# Patient Record
Sex: MALE | Race: WHITE | NOT HISPANIC OR LATINO | Employment: FULL TIME | URBAN - METROPOLITAN AREA
[De-identification: names, ages, dates, MRNs, and addresses within clinical notes are randomized per-mention and may not be internally consistent; named-entity substitution may affect disease eponyms.]

---

## 2017-02-15 ENCOUNTER — OFFICE VISIT (OUTPATIENT)
Dept: PHYSICAL MEDICINE AND REHAB | Facility: MEDICAL CENTER | Age: 32
End: 2017-02-15
Payer: MEDICAID

## 2017-02-15 VITALS
OXYGEN SATURATION: 94 % | DIASTOLIC BLOOD PRESSURE: 68 MMHG | SYSTOLIC BLOOD PRESSURE: 110 MMHG | HEART RATE: 89 BPM | WEIGHT: 151 LBS | HEIGHT: 76 IN | BODY MASS INDEX: 18.39 KG/M2 | TEMPERATURE: 98.4 F

## 2017-02-15 DIAGNOSIS — M54.9 MID BACK PAIN: ICD-10-CM

## 2017-02-15 DIAGNOSIS — G62.9 POLYNEUROPATHY: ICD-10-CM

## 2017-02-15 DIAGNOSIS — M79.2 NERVE PAIN: ICD-10-CM

## 2017-02-15 DIAGNOSIS — Z71.89 PAIN MEDICATION AGREEMENT DISCUSSED: ICD-10-CM

## 2017-02-15 DIAGNOSIS — G11.11 FRIEDREICH ATAXIA (HCC): ICD-10-CM

## 2017-02-15 DIAGNOSIS — G89.29 CHRONIC BILATERAL LOW BACK PAIN, WITH SCIATICA PRESENCE UNSPECIFIED: ICD-10-CM

## 2017-02-15 DIAGNOSIS — R25.2 MUSCLE CRAMPS: ICD-10-CM

## 2017-02-15 DIAGNOSIS — R26.9 IMPAIRED GAIT: ICD-10-CM

## 2017-02-15 DIAGNOSIS — M41.9 SCOLIOSIS OF THORACOLUMBAR SPINE, UNSPECIFIED SCOLIOSIS TYPE: ICD-10-CM

## 2017-02-15 DIAGNOSIS — M25.571 RIGHT ANKLE PAIN, UNSPECIFIED CHRONICITY: ICD-10-CM

## 2017-02-15 DIAGNOSIS — M54.5 CHRONIC BILATERAL LOW BACK PAIN, WITH SCIATICA PRESENCE UNSPECIFIED: ICD-10-CM

## 2017-02-15 PROCEDURE — 99204 OFFICE O/P NEW MOD 45 MIN: CPT | Performed by: PHYSICAL MEDICINE & REHABILITATION

## 2017-02-15 RX ORDER — TRAMADOL HYDROCHLORIDE 50 MG/1
TABLET ORAL
COMMUNITY
Start: 2016-11-22 | End: 2017-02-15

## 2017-02-15 RX ORDER — OXYCODONE HYDROCHLORIDE AND ACETAMINOPHEN 5; 325 MG/1; MG/1
TABLET ORAL
Qty: 20 TAB | Refills: 0 | Status: SHIPPED | OUTPATIENT
Start: 2017-02-15 | End: 2017-03-28 | Stop reason: SDUPTHER

## 2017-02-15 ASSESSMENT — ENCOUNTER SYMPTOMS
FEVER: 0
NECK PAIN: 1
EYE PAIN: 0
DIARRHEA: 0
CLAUDICATION: 0
CHILLS: 0
MYALGIAS: 1
TINGLING: 1
VOMITING: 0
SENSORY CHANGE: 1
HEMOPTYSIS: 0
EYE DISCHARGE: 0
BACK PAIN: 1
ORTHOPNEA: 0
SPUTUM PRODUCTION: 0

## 2017-02-15 NOTE — PROGRESS NOTES
Subjective:      Aris Douglass is a 31 y.o. 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 The patient has family history of Friedreich's ataxia, apparently genetic testing not covered by insurance.    Regarding today's visit:    The patient notes ongoing pain in the mid back as well as the low back. The patient notes lower limb nerve pain. The patient notes decreased sensation below the knees.    The patient notes mid back pain, some posterior chest wall pain, without overt radicular component.    The patient notes intermittent neck pain, controlled, without radicular component.    The patient notes intermittent paresthesias in the hands, primarily on the left.    The patient notes right ankle pain, primarily the lateral aspect. He has had prior ankle sprain in approximately November 2016, seen by orthopedics, records pending. The patient reports ankle instability symptoms. The patient had prior right foot fracture is 2014, healed. He has a right ankle brace.    The patient has had prior treatment with medications. He is tried therapy/exercise. No bowel/bladder incontinence noted. He is making an effort with home exercise program as tolerated. The ongoing pain limits his ability to function. He is inquiring about additional treatment options, also presents as a transfer of care for pain management.      MEDICAL RECORDS REVIEW/DATA REVIEW: Reviewed in epic.    Records Reviewed: Reviewed referring provider notes. Review cardiology records 8/2016.    I reviewed medications.  Current pain not adequately controlled. Has used Percocet in the past, with benefit, tolerated. Tolerating gabapentin and baclofen, with benefit. Uses ibuprofen/NSAIDs intermittently. No aberrant behavior noted. Previously tried tramadol, not tolerated or effective.    I reviewed  profile  2/15/2017.     I reviewed diagnostic studies:     I reviewed radiographs. Reviewed MRI cervical spine 7/2016. Reviewed MRI brain 7/2016.    I reviewed lab studies. No recent laboratory studies available for review.    I reviewed medical issues. Followed by primary care provider, records reviewed.    I reviewed family history: No neuromuscular disorders noted.    I reviewed social issues. Enjoys working with cars.      PAST MEDICAL HISTORY:   Past Medical History   Diagnosis Date   • No known health problems    • Fracture 2000     right foot/ right ankle       PAST SURGICAL HISTORY:    Past Surgical History   Procedure Laterality Date   • Knee arthroscopy  2000     right knee   • Elbow orif  2001     left elbow   • Percutaneouospinning wrist  2001     left    • Mandible reduction closed  2001       ALLERGIES:  Hydrocodone    MEDICATIONS:    Outpatient Encounter Prescriptions as of 2/15/2017   Medication Sig Dispense Refill   • oxycodone-acetaminophen (PERCOCET) 5-325 MG Tab Take 1/2  to 1 tablets by daily as needed for pain. 20 Tab 0   • gabapentin (NEURONTIN) 600 MG tablet Take 1 Tab by mouth 3 times a day. 90 Tab 11   • baclofen (LIORESAL) 10 MG Tab Take 1 Tab by mouth 2 times a day. (Patient taking differently: Take 10 mg by mouth 3 times a day.) 60 Tab 5   • [DISCONTINUED] tramadol (ULTRAM) 50 MG Tab      • ibuprofen (MOTRIN) 600 MG TABS Take 1 Tab by mouth every 8 hours as needed for Mild Pain for up to 30 doses. 30 Tab 0     No facility-administered encounter medications on file as of 2/15/2017.       SOCIAL HISTORY:    Social History     Social History   • Marital Status: Single     Spouse Name: N/A   • Number of Children: N/A   • Years of Education: N/A     Social History Main Topics   • Smoking status: Current Every Day Smoker -- 0.50 packs/day for 10 years     Types: Cigarettes   • Smokeless tobacco: None   • Alcohol Use: No      Comment: notes rare use, agrees to avoid use   • Drug Use: No   • Sexual  "Activity: Not Asked     Other Topics Concern   • None     Social History Narrative     No substance use/abuse noted.    Other than perhaps anxiety/depression, adjustment disorder,psychiatric disorder noted    Review of Systems   Constitutional: Negative for fever and chills.   HENT: Negative for hearing loss and tinnitus.    Eyes: Negative for pain and discharge.   Respiratory: Negative for hemoptysis and sputum production.    Cardiovascular: Negative for orthopnea and claudication.   Gastrointestinal: Negative for vomiting and diarrhea.   Genitourinary: Negative for urgency and frequency.   Musculoskeletal: Positive for myalgias, back pain, joint pain and neck pain.   Skin: Negative.    Neurological: Positive for tingling and sensory change.   Endo/Heme/Allergies: Negative.    All other systems reviewed and are negative.        Objective:     /68 mmHg  Pulse 89  Temp(Src) 36.9 °C (98.4 °F)  Ht 1.93 m (6' 3.98\")  Wt 68.493 kg (151 lb)  BMI 18.39 kg/m2  SpO2 94%     Physical Exam  Constitutional: oriented to person, place, and time, appears well-developed and well-nourished.   HEENT: Normocephalic atraumatic, neck supple, no JVD noted, no masses noted, no meningeal signs noted  Lymphadenopathy: no cervical, supraclavicular, or inguinal lymphadenopathy noted  Cardiovascular: Intact distal pulses, including at wrists and ankles, no limb swelling noted  Pulmonary: No tachypnea noted, no accessory muscle use noted, no dyspnea noted  Abdominal: Soft, nontender, exhibits no distension, no peritoneal signs, no HSM  Musculoskeletal:   Right shoulder: exhibits only mild tenderness. Minimal pain with range of motion testing  Left shoulder: exhibits only mild tenderness. Minimal pain with range of motion testing  Right hip: exhibits only mild tenderness. Minimal pain with range of motion testing  Left hip: exhibits only mild tenderness. Minimal pain with range of motion testing  Cervical back: exhibits mild " decreased range of motion, mild tenderness and mild pain. Spurling's testing produces mild axial pain, trigger points noted  Lumbar back: exhibits mild decreased range of motion, mild tenderness and mild pain. negative straight leg testing, trigger points noted, scoliosis noted  Thoracic: Tender with palpation mid back region, pain with range of motion testing, scoliosis noted  Ankle: Tender with palpation about right ankle, prominent lateral malleolus, mild instability with stress testing, no signs of infection  Wrist/hand: mild pain with range of motion testing, negative tinel's at wrist, negative tinel's at elbows  Neurological: oriented to person, place, and time. Cranial nerves grossly intact, normal strength for condition. Sensation intact distally. Reflexes 1+ in upper and lower limbs, Gait antalgic, wide-based, reciprocal, unable to perform heel walk/toe walk, unsteady with Romberg testing with eyes open  Skin: Skin is intact. no rashes or lesions noted  Psychiatric: normal mood and affect. speech is normal and behavior is normal. Judgment and thought content normal. Cognition and memory are normal.         Assessment/Plan:       ASSESSMENT:    1. Friedreich's ataxia, sensory-motor axonal polyneuropathy, neurology following    2. Low back pain, myofascial pain, lower limb nerve pain, scoliosis, consider radiculitis/stenosis    - Ordered thoracolumbar x-ray  - Ordered MRI lumbar spine without contrast    3. Mid back pain, myofascial pain, nerve pain, scoliosis, consider stenosis    - Ordered thoracolumbar x-rays  - Ordered MRI thoracic spine without contrast    4. Right ankle pain, sprain strain, prominent lateral malleolus, instability symptoms, remote right foot fracture    - Ordered right ankle x-rays  - Ordered MRI right ankle to evaluate for soft tissue pathology    5. Neck pain, myofascial pain, relatively controlled    6. Controlled substance agreement discussed    - Check results on urine drug screen,  provided today  - I would be interested reviewing most recent laboratory studies, including CMP      DISCUSSION/PLAN:    - I discussed management options. I reviewed symptomatic care    - I reviewed home exercise program, with medical precautions    - The patient can consider complementary trials with acupuncture, massage therapy, or TENS unit    - I reviewed medication monitoring. I reviewed pain medication dosing, reviewed risks and alternatives.    I recommend taper/minimize use of benzodiazepines due to risk of interaction with pain medication. I reviewed medication adjustments as current pain not adequately controlled.     - Bending clean drug testing I wrote prescription for the following under the compassionate use provision:     - oxycodone-acetaminophen (PERCOCET) 5-325 MG Tab; Take 1/2  to 1 tablets by daily as needed for pain.  Dispense: 20 Tab; Refill: 0, for intermittent use, which the patient has used in the past, tolerated, with benefit    - I reviewed risks, side effects, and interactions of medications, including NSAIDs and over-the-counter medications. I reviewed tylenol/acetaminophen dosing.  I reviewed bowel management program. I recommend avoid use of benzodiazepines due to risk of interaction with pain medication. I advise the patient to avoid alcohol use. I reviewed the controlled substance prescribing program. I reviewed further symptomatic medications.    - I reviewed additional diagnostic options, including further/advanced imaging, vascular studies, and further lab screen    - I reviewed additional therapeutic options, including injection therapy and additional consultative input     - I reviewed psychosocial interventions    - I encourage the patient to stop or decrease cigarette use    - Followup with primary care provider and consultants regarding co-morbid medical isues, as well as for health maintenance/co-management    - Return after the above-noted diagnostic studies  or an  as-needed basis      Please note that this dictation was created using voice recognition software. I have made every reasonable attempt to correct obvious errors but there may be errors of grammar and content that I may have overlooked prior to finalization of this note.

## 2017-02-15 NOTE — MR AVS SNAPSHOT
"Aris Douglass   2/15/2017 8:45 AM   Office Visit   MRN: [id number]    Department:  [facility]   Dept Phone:  [phone]    Description:  Male : 1985   Provider:  Timur De Leon M.D.           Reason for Visit     New Patient           Allergies as of 2/15/2017     Allergen Noted Reactions    Hydrocodone 2014   Itching      You were diagnosed with     Polyneuropathy (CMS-HCC)   [349578]       Muscle cramps   [516148]       Friedreich ataxia (CMS-[facility])   [318730]       Impaired gait   [797591]       Mid back pain   [142081]       Chronic bilateral low back pain, with sciatica presence unspecified   [2437579]       Scoliosis of thoracolumbar spine, unspecified scoliosis type   [7454443]       Right ankle pain, unspecified chronicity   [1009618]       Pain medication agreement discussed   [161226]         Vital Signs     Blood Pressure Pulse Temperature Height Weight Body Mass Index    110/68 mmHg 89 36.9 °C (98.4 °F) 1.93 m (6' 3.98\") 68.493 kg (151 lb) 18.39 kg/m2    Oxygen Saturation Smoking Status                94% Current Every Day Smoker          Basic Information     Date Of Birth Sex Race Ethnicity Preferred Language    1985 Male White Non- English      Your appointments     Mar 03, 2017  9:20 AM   Follow Up Visit with Brian Cheatham M.D.   [facility] (--)    [address], [address]  [facility] [address] [phone]           You will be receiving a confirmation call a few days before your appointment from our automated call confirmation system.            Mar 28, 2017 10:00 AM   Follow Up Visit with Timur De Leon M.D.   [facility] (--)    [address]  [facility] [address] [phone]           You will be receiving a confirmation call a few days before your appointment from our automated call confirmation system.              Problem List              ICD-10-CM Priority Class Noted - Resolved    Metatarsal " fracture S92.309A   6/19/2014 - Present      Health Maintenance        Date Due Completion Dates    IMM DTaP/Tdap/Td Vaccine (1 - Tdap) 7/23/2005 ---    IMM INFLUENZA (1) 9/1/2016 ---            Current Immunizations     No immunizations on file.      Below and/or attached are the medications your provider expects you to take. Review all of your home medications and newly ordered medications with your provider and/or pharmacist. Follow medication instructions as directed by your provider and/or pharmacist. Please keep your medication list with you and share with your provider. Update the information when medications are discontinued, doses are changed, or new medications (including over-the-counter products) are added; and carry medication information at all times in the event of emergency situations     Allergies:  HYDROCODONE - Itching               Medications  Valid as of: February 15, 2017 -  9:14 AM    Generic Name Brand Name Tablet Size Instructions for use    Baclofen (Tab) LIORESAL 10 MG Take 1 Tab by mouth 2 times a day.        Gabapentin (Tab) NEURONTIN 600 MG Take 1 Tab by mouth 3 times a day.        Ibuprofen (Tab) MOTRIN 600 MG Take 1 Tab by mouth every 8 hours as needed for Mild Pain for up to 30 doses.        Oxycodone-Acetaminophen (Tab) PERCOCET 5-325 MG Take 1/2  to 1 tablets by daily as needed for pain.        .                 Medicines prescribed today were sent to:     [id number] - [place] [address] [address] [address]    Phone: [phone] Fax: [phone]    Open 24 Hours?: No      Medication refill instructions:       If your prescription bottle indicates you have medication refills left, it is not necessary to call your provider’s office. Please contact your pharmacy and they will refill your medication.    If your prescription bottle indicates you do not have any refills left, you may request refills at any time through  one of the following ways: The online Clipboard system (except Urgent Care), by calling your provider’s office, or by asking your pharmacy to contact your provider’s office with a refill request. Medication refills are processed only during regular business hours and may not be available until the next business day. Your provider may request additional information or to have a follow-up visit with you prior to refilling your medication.   *Please Note: Medication refills are assigned a new Rx number when refilled electronically. Your pharmacy may indicate that no refills were authorized even though a new prescription for the same medication is available at the pharmacy. Please request the medicine by name with the pharmacy before contacting your provider for a refill.        Your To Do List     Future Labs/Procedures Complete By Expires    DX-ANKLE 3+ VIEWS RIGHT  As directed 2/15/2018    DX-THORACOLUMBAR SPINE-2 VIEWS  As directed 2/15/2018    MR-ANKLE W/O RIGHT  As directed 2/15/2018    MR-LUMBAR SPINE-W/O  As directed 2/15/2018    MR-THORACIC SPINE-W/O  As directed 2/15/2018    PAIN MANAGEMENT SCRN, W/ RFLX TO QNT  As directed 2/15/2018    Comments:    Current Meds (name, sig, last dose):   Current outpatient prescriptions:   •  gabapentin, 600 mg, Oral, TID, 2/15/2017  •  baclofen, 10 mg, Oral, BID, 2/15/2017  •  ibuprofen, 600 mg, Oral, Q8HRS PRN, prn               Clipboard Access Code: [email or web address]  Expires: 3/15/2017 11:35 AM    Clipboard  A secure, online tool to manage your health information     [employer] Clipboard® is a secure, online tool that connects you to your personalized health information from the privacy of your home -- day or night - making it very easy for you to manage your healthcare. Once the activation process is completed, you can even access your medical information using the Clipboard leon, which is available for free in the Apple Leon store or Google Play store.     Clipboard provides the  following levels of access (as shown below):   My Chart Features   [facility] Doctor [facility]  Specialists [facility]  Urgent  Care Non-[facility]  Primary Care  Doctor   Email your healthcare team securely and privately 24/7 X X X    Manage appointments: schedule your next appointment; view details of past/upcoming appointments X      Request prescription refills. X      View recent personal medical records, including lab and immunizations X X X X   View health record, including health history, allergies, medications X X X X   Read reports about your outpatient visits, procedures, consult and ER notes X X X X   See your discharge summary, which is a recap of your hospital and/or ER visit that includes your diagnosis, lab results, and care plan. X X       How to register for [email or web address]:  1. Go to  [email or web address].  2. Click on the Sign Up Now box, which takes you to the New Member Sign Up page. You will need to provide the following information:  a. Enter your [email or web address] Access Code exactly as it appears at the top of this page. (You will not need to use this code after you’ve completed the sign-up process. If you do not sign up before the expiration date, you must request a new code.)   b. Enter your date of birth.   c. Enter your home email address.   d. Click Submit, and follow the next screen’s instructions.  3. Create a [email or web address] ID. This will be your [email or web address] login ID and cannot be changed, so think of one that is secure and easy to remember.  4. Create a [email or web address] password. You can change your password at any time.  5. Enter your Password Reset Question and Answer. This can be used at a later time if you forget your password.   6. Enter your e-mail address. This allows you to receive e-mail notifications when new information is available in [email or web address].  7. Click Sign Up. You can now view your health information.    For assistance activating your [email or web address] account, call [phone]

## 2017-03-03 ENCOUNTER — APPOINTMENT (OUTPATIENT)
Dept: NEUROLOGY | Facility: MEDICAL CENTER | Age: 32
End: 2017-03-03
Payer: MEDICAID

## 2017-03-10 ENCOUNTER — APPOINTMENT (OUTPATIENT)
Dept: RADIOLOGY | Facility: MEDICAL CENTER | Age: 32
End: 2017-03-10
Attending: PHYSICAL MEDICINE & REHABILITATION
Payer: MEDICAID

## 2017-03-10 DIAGNOSIS — G89.29 CHRONIC BILATERAL LOW BACK PAIN, WITH SCIATICA PRESENCE UNSPECIFIED: ICD-10-CM

## 2017-03-10 DIAGNOSIS — M54.5 CHRONIC BILATERAL LOW BACK PAIN, WITH SCIATICA PRESENCE UNSPECIFIED: ICD-10-CM

## 2017-03-10 DIAGNOSIS — R26.9 IMPAIRED GAIT: ICD-10-CM

## 2017-03-10 DIAGNOSIS — M41.9 SCOLIOSIS OF THORACOLUMBAR SPINE, UNSPECIFIED SCOLIOSIS TYPE: ICD-10-CM

## 2017-03-10 DIAGNOSIS — M25.571 RIGHT ANKLE PAIN, UNSPECIFIED CHRONICITY: ICD-10-CM

## 2017-03-10 DIAGNOSIS — G11.11 FRIEDREICH ATAXIA (HCC): ICD-10-CM

## 2017-03-10 DIAGNOSIS — M54.9 MID BACK PAIN: ICD-10-CM

## 2017-03-10 PROCEDURE — 72080 X-RAY EXAM THORACOLMB 2/> VW: CPT

## 2017-03-10 PROCEDURE — 73721 MRI JNT OF LWR EXTRE W/O DYE: CPT | Mod: RT

## 2017-03-10 PROCEDURE — 73610 X-RAY EXAM OF ANKLE: CPT | Mod: RT

## 2017-03-10 PROCEDURE — 72146 MRI CHEST SPINE W/O DYE: CPT

## 2017-03-10 PROCEDURE — 72148 MRI LUMBAR SPINE W/O DYE: CPT

## 2017-03-27 ENCOUNTER — OFFICE VISIT (OUTPATIENT)
Dept: NEUROLOGY | Facility: MEDICAL CENTER | Age: 32
End: 2017-03-27
Payer: MEDICAID

## 2017-03-27 VITALS
HEART RATE: 86 BPM | RESPIRATION RATE: 16 BRPM | DIASTOLIC BLOOD PRESSURE: 72 MMHG | SYSTOLIC BLOOD PRESSURE: 118 MMHG | HEIGHT: 76 IN | BODY MASS INDEX: 19 KG/M2 | WEIGHT: 156 LBS | TEMPERATURE: 98.8 F | OXYGEN SATURATION: 98 %

## 2017-03-27 DIAGNOSIS — R25.2 MUSCLE CRAMPS: ICD-10-CM

## 2017-03-27 DIAGNOSIS — G62.9 POLYNEUROPATHY: ICD-10-CM

## 2017-03-27 PROCEDURE — 99214 OFFICE O/P EST MOD 30 MIN: CPT | Performed by: PSYCHIATRY & NEUROLOGY

## 2017-03-27 RX ORDER — BACLOFEN 10 MG/1
10 TABLET ORAL 2 TIMES DAILY
Qty: 60 TAB | Refills: 11 | Status: SHIPPED | OUTPATIENT
Start: 2017-03-27 | End: 2017-11-27

## 2017-03-27 RX ORDER — GABAPENTIN 600 MG/1
600 TABLET ORAL 3 TIMES DAILY
Qty: 90 TAB | Refills: 11 | Status: SHIPPED | OUTPATIENT
Start: 2017-03-27 | End: 2017-07-26

## 2017-03-27 ASSESSMENT — PATIENT HEALTH QUESTIONNAIRE - PHQ9: CLINICAL INTERPRETATION OF PHQ2 SCORE: 0

## 2017-03-27 NOTE — MR AVS SNAPSHOT
"Aris Douglass   3/27/2017 10:20 AM   Office Visit   MRN: [id number]    Department:  Neurology [facility]   Dept Phone:  [phone]    Description:  Male : 1985   Provider:  Brian Cheatham M.D.           Reason for Visit     Follow-Up riedreich ataxia (CMS-HCC)      Allergies as of 3/27/2017     Allergen Noted Reactions    Hydrocodone 2014   Itching      You were diagnosed with     Muscle cramps   [839124]       Polyneuropathy (CMS-HCC)   [456558]         Vital Signs     Blood Pressure Pulse Temperature Respirations Height Weight    118/72 mmHg 86 37.1 °C (98.8 °F) 16 1.93 m (6' 4\") 70.761 kg (156 lb)    Body Mass Index Oxygen Saturation Smoking Status             19.00 kg/m2 98% Current Every Day Smoker         Basic Information     Date Of Birth Sex Race Ethnicity Preferred Language    1985 Male White Non- English      Your appointments     Mar 27, 2017 10:20 AM   Follow Up Visit with Brian Cheatham M.D.   [facility] Neurology (--)    [address], [address]  [facility] [address] [phone]           You will be receiving a confirmation call a few days before your appointment from our automated call confirmation system.            Mar 28, 2017 10:00 AM   Follow Up Visit with Timur De Leon M.D.   [facility] PHYSIATRY (--)    [address]  [facility] [address] [phone]           You will be receiving a confirmation call a few days before your appointment from our automated call confirmation system.              Problem List              ICD-10-CM Priority Class Noted - Resolved    Metatarsal fracture S92.309A   2014 - Present      Health Maintenance        Date Due Completion Dates    IMM DTaP/Tdap/Td Vaccine (1 - Tdap) 2005 ---    IMM INFLUENZA (1) 2016 ---            Current Immunizations     No immunizations on file.      Below and/or attached are the medications your provider expects you to take. Review all of your home " medications and newly ordered medications with your provider and/or pharmacist. Follow medication instructions as directed by your provider and/or pharmacist. Please keep your medication list with you and share with your provider. Update the information when medications are discontinued, doses are changed, or new medications (including over-the-counter products) are added; and carry medication information at all times in the event of emergency situations     Allergies:  HYDROCODONE - Itching               Medications  Valid as of: March 27, 2017 - 10:18 AM    Generic Name Brand Name Tablet Size Instructions for use    Baclofen (Tab) LIORESAL 10 MG Take 1 Tab by mouth 2 times a day.        Gabapentin (Tab) NEURONTIN 600 MG Take 1 Tab by mouth 3 times a day.        Ibuprofen (Tab) MOTRIN 600 MG Take 1 Tab by mouth every 8 hours as needed for Mild Pain for up to 30 doses.        Oxycodone-Acetaminophen (Tab) PERCOCET 5-325 MG Take 1/2  to 1 tablets by daily as needed for pain.        .                 Medicines prescribed today were sent to:     [id number] [address]    Phone: [phone] Fax: [phone]    Open 24 Hours?: No      Medication refill instructions:       If your prescription bottle indicates you have medication refills left, it is not necessary to call your provider’s office. Please contact your pharmacy and they will refill your medication.    If your prescription bottle indicates you do not have any refills left, you may request refills at any time through one of the following ways: The online [employer] system (except Urgent Care), by calling your provider’s office, or by asking your pharmacy to contact your provider’s office with a refill request. Medication refills are processed only during regular business hours and may not be available until the next business day. Your provider may request additional  information or to have a follow-up visit with you prior to refilling your medication.   *Please Note: Medication refills are assigned a new Rx number when refilled electronically. Your pharmacy may indicate that no refills were authorized even though a new prescription for the same medication is available at the pharmacy. Please request the medicine by name with the pharmacy before contacting your provider for a refill.        Your To Do List     Future Labs/Procedures Complete By Expires    CBC WITH DIFFERENTIAL  As directed 3/27/2018    COMP METABOLIC PANEL  As directed 3/27/2018         Align[employer] Software Access Code: [email or web address]  Expires: 4/26/2017 10:18 AM    Alignent Software  A secure, online tool to manage your health information     [employer] Alignent Software® is a secure, online tool that connects you to your personalized health information from the privacy of your home -- day or night - making it very easy for you to manage your healthcare. Once the activation process is completed, you can even access your medical information using the Al[employer] Software leon, which is available for free in the Apple Leon store or Google Play store.     Alignent Software provides the following levels of access (as shown below):   My Chart Features   [facility] Doctor [facility]  Specialists [facility]  Urgent  Care [facility]  Primary Care  Doctor   Email your healthcare team securely and privately 24/7 X X X    Manage appointments: schedule your next appointment; view details of past/upcoming appointments X      Request prescription refills. X      View recent personal medical records, including lab and immunizations X X X X   View health record, including health history, allergies, medications X X X X   Read reports about your outpatient visits, procedures, consult and ER notes X X X X   See your discharge summary, which is a recap of your hospital and/or ER visit that includes your diagnosis, lab results, and care plan. X X       How to register for  [facility]:  1. Go to  [email or web address].  2. Click on the Sign Up Now box, which takes you to the New Member Sign Up page. You will need to provide the following information:  a. Enter your [employer] Access Code exactly as it appears at the top of this page. (You will not need to use this code after you’ve completed the sign-up process. If you do not sign up before the expiration date, you must request a new code.)   b. Enter your date of birth.   c. Enter your home email address.   d. Click Submit, and follow the next screen’s instructions.  3. Create a [employer] ID. This will be your [employer] login ID and cannot be changed, so think of one that is secure and easy to remember.  4. Create a [facility] password. You can change your password at any time.  5. Enter your Password Reset Question and Answer. This can be used at a later time if you forget your password.   6. Enter your e-mail address. This allows you to receive e-mail notifications when new information is available in [employer].  7. Click Sign Up. You can now view your health information.    For assistance activating your [employer] account, call [phone]        Quit Tobacco Information     Do you want to quit using tobacco?    Quitting tobacco decreases risks of cancer, heart and lung disease, increases life expectancy, improves sense of taste and smell, and increases spending money, among other benefits.    If you are thinking about quitting, we can help.  • [facility] Quit Tobacco Program: [phone]  o Program occurs weekly for four weeks and includes pharmacist consultation on products to support quitting smoking or chewing tobacco. A provider referral is needed for pharmacist consultation.  • Tobacco Users Help Hotline: 5-800-QUIT-NOW (154-1923) or https://[place].quitlogix.org/  o Free, confidential telephone and online coaching for [place] residents. Sessions are designed on a schedule that is convenient for you. Eligible clients receive free nicotine  replacement therapy.  • Nationally: www.smokefree.gov  o Information and professional assistance to support both immediate and long-term needs as you become, and remain, a non-smoker. Smokefree.gov allows you to choose the help that best fits your needs.

## 2017-03-27 NOTE — PROGRESS NOTES
Chief Complaint   Patient presents with   • Follow-Up     riedreich ataxia (CMS-HCC)       Problem List Items Addressed This Visit     None      Visit Diagnoses     Muscle cramps         Relevant Medications     baclofen (LIORESAL) 10 MG Tab     gabapentin (NEURONTIN) 600 MG tablet     Other Relevant Orders     CBC WITH DIFFERENTIAL     COMP METABOLIC PANEL     Polyneuropathy (CMS-HCC)         Relevant Medications     baclofen (LIORESAL) 10 MG Tab     gabapentin (NEURONTIN) 600 MG tablet     Other Relevant Orders     CBC WITH DIFFERENTIAL     COMP METABOLIC PANEL           Interim history:  Aris Douglass returns in follow up. He continues on baclofen 10 mg po bid and neurontin tid. He denies any side effects. Particularly mood is good. He is seeing a pain management specialist. He complaints of back pain and shooting pain on his LE's.     He had XRays and MRI's of his thoracic and lumbar spines.     No falls.     Has not have genetic testing through [facility] [employer] yet, states needs to pay $250 for it.     He states he has seen a cardiologist who told him everything was ok with his heart.         Past medical history:   Past Medical History   Diagnosis Date   • No known health problems    • Fracture 2000     right foot/ right ankle       Past surgical history:   Past Surgical History   Procedure Laterality Date   • Knee arthroscopy  2000     right knee   • Elbow orif  2001     left elbow   • Percutaneouospinning wrist  2001     left    • Mandible reduction closed  2001       Family history:   History reviewed. No pertinent family history.    Social history:   Social History     Social History   • Marital Status: Single     Spouse Name: N/A   • Number of Children: N/A   • Years of Education: N/A     Occupational History   • Not on file.     Social History Main Topics   • Smoking status: Current Every Day Smoker -- 0.25 packs/day for 10 years     Types: Cigarettes   • Smokeless tobacco: Never Used   • Alcohol Use: No       Comment: notes rare use, agrees to avoid use   • Drug Use: No   • Sexual Activity: Not on file     Other Topics Concern   • Not on file     Social History Narrative       Current medications:   Current Outpatient Prescriptions   Medication   • baclofen (LIORESAL) 10 MG Tab   • gabapentin (NEURONTIN) 600 MG tablet   • ibuprofen (MOTRIN) 600 MG TABS   • oxycodone-acetaminophen (PERCOCET) 5-325 MG Tab     No current facility-administered medications for this visit.       Medication Allergy:  Allergies   Allergen Reactions   • Hydrocodone Itching       Review of systems:   Constitutional: denies fever, night sweats, weight loss.    Eyes: denies acute vision change, eye pain or secretion.    Ears, Nose, Mouth, Throat: denies nasal secretion, nasal bleeding, difficulty swallowing, hearing loss, tinnitus, vertigo, ear pain, acute dental problems, oral ulcers or lesions.    Endocrine: denies recent weight changes, heat or cold intolerance, polyuria, polydypsia, polyphagia,abnormal hair growth.  Cardiovascular: denies new onset of chest pain, palpitations, syncope, or dyspnea of exertion.  Pulmonary: denies shortness of breath, new onset of cough, hemoptysis, wheezing, chest pain or flu-like symptoms.    GI: denies nausea, vomiting, diarrhea, GI bleeding, change in appetite, abdominal pain, and change in bowel habits.  : denies dysuria, urinary incontinence, hematuria.  Heme/oncology: denies history of easy bruising or bleeding. No history of cancer, DVTor PE.  Allergy/immunology: denies hives/urticaria, or itching.    Dermatologic: denies new rash, or new skin lesions. Has hair loss distally on his feet.    Musculoskeletal:denies joint swelling or pain, muscle pain, neck. Has mild back and hip pain but no clear radiation onto his legs.    Neurologic: denies headaches, acute visual changes, facial droopiness, memory loss, abnormal movements, seizures, loss of consciousness, or episodes of confusion.    Psychiatric: denies  "symptoms of depression, anxiety, hallucinations, mood swings or changes, suicidal or homicidal thoughts.       Physical examination:   Filed Vitals:    03/27/17 0948   BP: 118/72   Pulse: 86   Temp: 37.1 °C (98.8 °F)   Resp: 16   Height: 1.93 m (6' 4\")   Weight: 70.761 kg (156 lb)   SpO2: 98%     General: Patient in no acute distress, pleasant and cooperative. Tall stature.    HEENT: Normocephalic, no signs of acute trauma.    Neck: supple, no meningeal signs or carotid bruits. There is normal range of motion. No tenderness on exam.    Chest: clear to auscultation. No cough.    CV: RRR, no murmurs.    Skin: no signs of acute rashes or trauma. Hair loss mid-shin down.     Musculoskeletal: joints exhibit full range of motion, without any pain to palpation. There are no signs of joint or muscle swelling. There is no tenderness to deep palpation of muscles. Mildly arched feet.    Psychiatric: No hallucinatory behavior. Denies symptoms of depression or suicidal ideation. Mood and affect appear normal on exam.     NEUROLOGICAL EXAM:    Mental status, orientation: Awake, alert and fully oriented.    Speech and language: speech is clear and fluent. The patient is able to name, repeat and comprehend.    Memory: There is intact recollection of recent and remote events.    Cranial nerve exam: Pupils are 3-4 mm bilaterally and equally reactive to light and accommodation. Visual fields are intact by confrontation. There is no nystagmus on primary or secondary gaze. Intact full EOM in all directions of gaze. Face appears symmetric. Sensation in the face is intact to light touch. Uvula is midline. Palate elevates symmetrically. Tongue is midline and without any signs of tongue biting or fasciculations. Sternocleidomastoid muscles exhibit is normal strength bilaterally. Shoulder shrug is intact bilaterally.    Motor exam: Strength is 5/5 in all extremities. Tone is normal. No abnormal movements were seen on exam.    Sensory exam " reveals normal sense of light touch in all extremities. He has decreased sense of propioception and vibration on his feet.    Deep tendon reflexes:  2+ in the arms, absent ankle jerks. Plantar responses are equivocal. There is no clonus.    Coordination: shows a normal finger-nose-finger. Normal rapidly alternating movements.    Gait: The patient was able to get up from seated position on first attempt without requiring assistance. Found to be steady when walking. Movements were fluid with normal arm swing. The patient was able to turn without difficulties or tendency to fall. Romberg examination shows swaying in different directions.      ANCILLARY DATA REVIEWED:     Lab Data Review:  No results found for this or any previous visit (from the past 24 hour(s)).    Records reviewed:    Chart reviewed.      Imagin16:  MRI brain wo contrast,    1.  MRI of the brain without contrast within normal limits.  2.  There is no evidence cerebellar atrophy.  3.  The visualized upper cervical spinal cord appear normal without any atrophy.    MRI cervical spine wo contrast, 16:     1.  Unremarkable noncontrast MR examination of the cervical spine.  2.  There is no evidence of cervical spinal cord atrophy.      MRI t spine wo, 3/10/17  No significant abnormality is seen in the MR scan of the thoracic spine.         MRI LS spine wo, 3/10/2017:  At L5-S1 there is mild broad posterior disc bulge which results in mild bilateral neural foraminal stenosis.        NCS/EMG, 8/10/16:  CLINICAL DISCUSSION:    The findings are suggestive of a moderate widespread mostly sensory and to lesser degree motor peripheral polyneuropathy. There are no elements on today's examination to suggest a demyelinating etiology.                      ASSESSMENT AND PLAN:    1. Muscle cramps  - baclofen (LIORESAL) 10 MG Tab; Take 1 Tab by mouth 2 times a day.  Dispense: 60 Tab; Refill: 11  - gabapentin (NEURONTIN) 600 MG tablet; Take 1 Tab by mouth 3  times a day.  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  Dispense: 90 Tab; Refill: 11  - CBC WITH DIFFERENTIAL; Future  - COMP METABOLIC PANEL; Future    CLINICAL DISCUSSION:   Tall stature. Strong family history of males with Friedriech Ataxia (FA). His brother had a positive genetic testing through [employer]. Medicaid does not cover genetic testing that we know of. We contacted [employer] today, they do accept Medicaid but they do not do FA testing. We discussed ordering through [facility], they do perform FA testing, they may have to pay out of pocket if not covered. I counseled him in terms of genetic testing and the possible repercussions of having a positive genetic testing. He underwent cardiology evaluation and apparently no concern for cardiomyopathy. Needs w/u w PCP to r/o altered glucose tolerance or DM.    Results of MRI testing discussed.     We discussed natural progression of the disease, possible genetic anticipation and lack of curative treatment.     The implications of positive genetic testing were discussed at length.     I cannot provide recommendations regarding evaluation of their child and recommend they obtain a consultation with a  to evaluation his risk.     Discussed side effects of both baclofen and neurontin, including weight changes, depression and risk for suicide, dizziness, drowsiness, hepatitis and others.         FOLLOW-UP:   Return in about 6 months (around 9/27/2017). or after genetic testing is done. Will have blood work in the next few months.           Brian Cheatham MD  Medical Director, Epilepsy and Neurodiagnostics.   Clinical  of Neurology [facility] [employer].   Diplomate in Neurology, Epilepsy, and  Electrodiagnostic Medicine.   Office: [phone]  Fax: [phone]

## 2017-03-28 ENCOUNTER — OFFICE VISIT (OUTPATIENT)
Dept: PHYSICAL MEDICINE AND REHAB | Facility: MEDICAL CENTER | Age: 32
End: 2017-03-28
Payer: MEDICAID

## 2017-03-28 VITALS
HEIGHT: 76 IN | DIASTOLIC BLOOD PRESSURE: 68 MMHG | HEART RATE: 86 BPM | BODY MASS INDEX: 19 KG/M2 | WEIGHT: 156 LBS | TEMPERATURE: 97.9 F | OXYGEN SATURATION: 100 % | SYSTOLIC BLOOD PRESSURE: 110 MMHG

## 2017-03-28 DIAGNOSIS — M54.16 LUMBAR RADICULITIS: ICD-10-CM

## 2017-03-28 DIAGNOSIS — M54.9 MID BACK PAIN: ICD-10-CM

## 2017-03-28 DIAGNOSIS — Z71.89 PAIN MEDICATION AGREEMENT DISCUSSED: ICD-10-CM

## 2017-03-28 DIAGNOSIS — M54.5 CHRONIC BILATERAL LOW BACK PAIN, WITH SCIATICA PRESENCE UNSPECIFIED: ICD-10-CM

## 2017-03-28 DIAGNOSIS — G11.11 FRIEDREICH ATAXIA (HCC): ICD-10-CM

## 2017-03-28 DIAGNOSIS — G89.29 CHRONIC BILATERAL LOW BACK PAIN, WITH SCIATICA PRESENCE UNSPECIFIED: ICD-10-CM

## 2017-03-28 DIAGNOSIS — M41.9 SCOLIOSIS OF THORACOLUMBAR SPINE, UNSPECIFIED SCOLIOSIS TYPE: ICD-10-CM

## 2017-03-28 DIAGNOSIS — M79.18 MYOFASCIAL PAIN: ICD-10-CM

## 2017-03-28 DIAGNOSIS — R26.9 IMPAIRED GAIT: ICD-10-CM

## 2017-03-28 PROCEDURE — 99214 OFFICE O/P EST MOD 30 MIN: CPT | Performed by: PHYSICAL MEDICINE & REHABILITATION

## 2017-03-28 RX ORDER — OXYCODONE HYDROCHLORIDE AND ACETAMINOPHEN 5; 325 MG/1; MG/1
TABLET ORAL
Qty: 30 TAB | Refills: 0 | Status: SHIPPED | OUTPATIENT
Start: 2017-03-28 | End: 2017-04-28 | Stop reason: SDUPTHER

## 2017-03-28 NOTE — MR AVS SNAPSHOT
"        Aris Douglass   3/28/2017 10:00 AM   Office Visit   MRN: [id number]    Department:  [facility]   Dept Phone:  [phone]    Description:  Male : 1985   Provider:  Timur De Leon M.D.           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[facility])   [572122]       Impaired gait   [690237]       Mid back pain   [162492]       Chronic bilateral low back pain, with sciatica presence unspecified   [3878744]       Scoliosis of thoracolumbar spine, unspecified scoliosis type   [7163429]       Pain medication agreement discussed   [[id number]]         Vital Signs     Blood Pressure Pulse Temperature Height Weight Body Mass Index    110/68 mmHg 86 36.6 °C (97.9 °F) 1.93 m (6' 3.98\") 70.761 kg (156 lb) 19.00 kg/m2    Oxygen Saturation Smoking Status                100% Current Every Day Smoker          Basic Information     Date Of Birth Sex Race Ethnicity Preferred Language    1985 Male White Non- English      Your appointments     2017 10:45 AM   Follow Up Visit with Timur De Leon M.D.   [facility] (--)    [address]  [facility] [id number] [phone]           You will be receiving a confirmation call a few days before your appointment from our automated call confirmation system.              Problem List              ICD-10-CM Priority Class Noted - Resolved    Metatarsal fracture S92.309A   2014 - Present      Health Maintenance        Date Due Completion Dates    IMM DTaP/Tdap/Td Vaccine (1 - Tdap) 2005 ---    IMM INFLUENZA (1) 2016 ---            Current Immunizations     No immunizations on file.      Below and/or attached are the medications your provider expects you to take. Review all of your home medications and newly ordered medications with your provider and/or pharmacist. Follow " medication instructions as directed by your provider and/or pharmacist. Please keep your medication list with you and share with your provider. Update the information when medications are discontinued, doses are changed, or new medications (including over-the-counter products) are added; and carry medication information at all times in the event of emergency situations     Allergies:  HYDROCODONE - Itching               Medications  Valid as of: March 28, 2017 - 10:22 AM    Generic Name Brand Name Tablet Size Instructions for use    Baclofen (Tab) LIORESAL 10 MG Take 1 Tab by mouth 2 times a day.        Gabapentin (Tab) NEURONTIN 600 MG Take 1 Tab by mouth 3 times a day.        Ibuprofen (Tab) MOTRIN 600 MG Take 1 Tab by mouth every 8 hours as needed for Mild Pain for up to 30 doses.        Oxycodone-Acetaminophen (Tab) PERCOCET 5-325 MG Take 1/2  to 1 tablets by daily as needed for pain.        .                 Medicines prescribed today were sent to:     [id number] [address] [facility]    [address] [place] [address]    Phone: [phone] Fax: [phone]    Open 24 Hours?: No      Medication refill instructions:       If your prescription bottle indicates you have medication refills left, it is not necessary to call your provider’s office. Please contact your pharmacy and they will refill your medication.    If your prescription bottle indicates you do not have any refills left, you may request refills at any time through one of the following ways: The online [employer] system (except Urgent Care), by calling your provider’s office, or by asking your pharmacy to contact your provider’s office with a refill request. Medication refills are processed only during regular business hours and may not be available until the next business day. Your provider may request additional information or to have a follow-up visit with you prior to refilling your medication.      *Please Note: Medication refills are assigned a new Rx number when refilled electronically. Your pharmacy may indicate that no refills were authorized even though a new prescription for the same medication is available at the pharmacy. Please request the medicine by name with the pharmacy before contacting your provider for a refill.        Your To Do List     Future Labs/Procedures Complete By Expires    PAIN MANAGEMENT SCRN, W/ RFLX TO QNT  As directed 3/28/2018    Comments:    Current Meds (name, sig, last dose):   Current outpatient prescriptions:   •  baclofen, 10 mg, Oral, BID, 3/28/2017  •  gabapentin, 600 mg, Oral, TID, 3/28/2017  •  oxycodone-acetaminophen, Take 1/2  to 1 tablets by daily as needed for pain.  •  ibuprofen, 600 mg, Oral, Q8HRS PRN, prn               Scooters Access Code: [email or web address]  Expires: 4/26/2017 10:18 AM    Scooters  A secure, online tool to manage your health information     [employer]’s Scooters® is a secure, online tool that connects you to your personalized health information from the privacy of your home -- day or night - making it very easy for you to manage your healthcare. Once the activation process is completed, you can even access your medical information using the Scooters leon, which is available for free in the Apple Leon store or Google Play store.     Scooters provides the following levels of access (as shown below):   My Chart Features   [facility] Doctor [facility]  Specialists [facility]  Urgent  Care [facility]  Primary Care  Doctor   Email your healthcare team securely and privately 24/7 X X X    Manage appointments: schedule your next appointment; view details of past/upcoming appointments X      Request prescription refills. X      View recent personal medical records, including lab and immunizations X X X X   View health record, including health history, allergies, medications X X X X   Read reports about your outpatient visits, procedures, consult and ER  notes X X X X   See your discharge summary, which is a recap of your hospital and/or ER visit that includes your diagnosis, lab results, and care plan. X X       How to register for [employer]:  1. Go to  [email or web address].  2. Click on the Sign Up Now box, which takes you to the New Member Sign Up page. You will need to provide the following information:  a. Enter your SenSage Access Code exactly as it appears at the top of this page. (You will not need to use this code after you’ve completed the sign-up process. If you do not sign up before the expiration date, you must request a new code.)   b. Enter your date of birth.   c. Enter your home email address.   d. Click Submit, and follow the next screen’s instructions.  3. Create a SenSage ID. This will be your SenSage login ID and cannot be changed, so think of one that is secure and easy to remember.  4. Create a SenSage password. You can change your password at any time.  5. Enter your Password Reset Question and Answer. This can be used at a later time if you forget your password.   6. Enter your e-mail address. This allows you to receive e-mail notifications when new information is available in SenSage.  7. Click Sign Up. You can now view your health information.    For assistance activating your SenSage account, call [phone]        Quit Tobacco Information     Do you want to quit using tobacco?    Quitting tobacco decreases risks of cancer, heart and lung disease, increases life expectancy, improves sense of taste and smell, and increases spending money, among other benefits.    If you are thinking about quitting, we can help.  • [employer] Quit Tobacco Program: 181.546.8408  o Program occurs weekly for four weeks and includes pharmacist consultation on products to support quitting smoking or chewing tobacco. A provider referral is needed for pharmacist consultation.  • Tobacco Users Help Hotline: 8-703-QUIT-NOW (672-1738) or  https://[place].quitlogix.org/  o Free, confidential telephone and online coaching for [place] residents. Sessions are designed on a schedule that is convenient for you. Eligible clients receive free nicotine replacement therapy.  • Nationally: www.smokefree.gov  o Information and professional assistance to support both immediate and long-term needs as you become, and remain, a non-smoker. Smokefree.gov allows you to choose the help that best fits your needs.

## 2017-03-28 NOTE — PROGRESS NOTES
Special Procedures H&P:    Subjective: Mr. Peralta notes ongoing pain in the lumbosacral region, radiating pain to the bilateral lower limbs with neuropathic component, worse with activities. He has had prior conservative treatment trial.    MEDICAL RECORDS REVIEW/DATA REVIEW: Reviewed in epic.    I reviewed medications.    I reviewed diagnostic studies:     I reviewed radiographs. Reviewed MRI lumbar spine 3/2017, showed disc protrusion at L5-S1, foraminal stenosis, degenerative disc disease. Reviewed MRI thoracic spine 3/2017 showed mild degenerative disc disease without stenosis. Reviewed thoracolumbar x-rays 3/2017 showed mild degenerative disc disease, mild scoliosis.     I reviewed lab studies. Reviewed labs 2/2016 including CMP, CBC.     I reviewed medical issues. Followed by primary care provider, prior records reviewed, and consultants including neurology, regarding Friedreich's ataxia, cardiology consulted.    I reviewed family history: No neuromuscular disorders noted.    I reviewed social issues. Enjoys working with cars.      PAST MEDICAL HISTORY:    Past Medical History     Past Medical History    Diagnosis  Date    •  No known health problems      •  Fracture  2000        right foot/ right ankle           PAST SURGICAL HISTORY:     Past Surgical History     Past Surgical History    Procedure  Laterality  Date    •  Knee arthroscopy    2000        right knee    •  Elbow orif    2001        left elbow    •  Percutaneouospinning wrist    2001        left     •  Mandible reduction closed    2001           ALLERGIES:  Hydrocodone    MEDICATIONS:     Encounter Medications     Outpatient Encounter Prescriptions as of 3/28/2017    Medication  Sig  Dispense  Refill    •  oxycodone-acetaminophen (PERCOCET) 5-325 MG Tab  Take 1/2  to 1 tablets by daily as needed for pain.  30 Tab  0    •  baclofen (LIORESAL) 10 MG Tab  Take 1 Tab by mouth 2 times a day.  60 Tab  11    •  gabapentin (NEURONTIN) 600 MG tablet   "Take 1 Tab by mouth 3 times a day.  90 Tab  11    •  [DISCONTINUED] oxycodone-acetaminophen (PERCOCET) 5-325 MG Tab  Take 1/2  to 1 tablets by daily as needed for pain.  20 Tab  0    •  ibuprofen (MOTRIN) 600 MG TABS  Take 1 Tab by mouth every 8 hours as needed for Mild Pain for up to 30 doses.  30 Tab  0        No facility-administered encounter medications on file as of 3/28/2017.           SOCIAL HISTORY:     Social History     Social History        Social History    •  Marital Status:  Single        Spouse Name:  N/A    •  Number of Children:  N/A    •  Years of Education:  N/A        Social History Main Topics    •  Smoking status:  Current Every Day Smoker -- 0.25 packs/day for 10 years        Types:  Cigarettes    •  Smokeless tobacco:  Never Used    •  Alcohol Use:  No    •  Drug Use:  No    •  Sexual Activity:  Not Asked        Other Topics  Concern    •  None        Social History Narrative         No substance use/abuse noted.    Other than perhaps anxiety/depression, adjustment disorder,psychiatric disorder noted    Review of Systems   Constitutional: Negative for fever and chills.   HENT: Negative for hearing loss and tinnitus.    Eyes: Negative for pain and discharge.   Respiratory: Negative for hemoptysis and sputum production.    Cardiovascular: Negative for orthopnea and claudication.   Gastrointestinal: Negative for vomiting and diarrhea.   Genitourinary: Negative for urgency and frequency.   Musculoskeletal: Positive for myalgias, back pain, joint pain and neck pain.   Skin: Negative.    Neurological: Positive for tingling and sensory change.   Endo/Heme/Allergies: Negative.    All other systems reviewed and are negative.          Objective:      /68 mmHg  Pulse 86  Temp(Src) 36.6 °C (97.9 °F)  Ht 1.93 m (6' 3.98\")  Wt 70.761 kg (156 lb)  BMI 19.00 kg/m2  SpO2 100%     Physical Exam  Constitutional: oriented to person, place, and time, appears well-developed and well-nourished. "    HEENT: Normocephalic atraumatic, neck supple, no JVD noted, no masses noted, no meningeal signs noted  Lymphadenopathy: no cervical, supraclavicular, or inguinal lymphadenopathy noted  Cardiovascular: Intact distal pulses, including at wrists and ankles, no limb swelling noted  Pulmonary: No tachypnea noted, no accessory muscle use noted, no dyspnea noted  Abdominal: Soft, nontender, exhibits no distension, no peritoneal signs, no HSM  Musculoskeletal:   Right shoulder: exhibits only mild tenderness. Minimal pain with range of motion testing  Left shoulder: exhibits only mild tenderness. Minimal pain with range of motion testing  Right hip: exhibits only mild tenderness. Minimal pain with range of motion testing  Left hip: exhibits only mild tenderness. Minimal pain with range of motion testing  Cervical back: exhibits mild decreased range of motion, mild tenderness and mild pain. Spurling's testing produces mild axial pain, trigger points noted  Lumbar back: exhibits mild decreased range of motion, mild tenderness and mild pain. straight leg testing produces posterior pelvic/thigh pain bilaterally, trigger points noted, scoliosis noted  Thoracic: Tender with palpation mid back region, pain with range of motion testing, scoliosis noted  Ankle: Tender with palpation about right ankle, prominent lateral malleolus, mild instability with stress testing, no signs of infection  Wrist/hand: mild pain with range of motion testing, negative tinel's at wrist, negative tinel's at elbows  Neurological: oriented to person, place, and time. Cranial nerves grossly intact, normal strength for condition. Sensation intact distally. Reflexes 1+ in upper and lower limbs, Gait antalgic, wide-based, reciprocal, unable to perform heel walk/toe walk, unsteady with Romberg testing with eyes open  Skin: Skin is intact. no rashes or lesions noted  Psychiatric: normal mood and affect. speech is normal and behavior is normal. Judgment and  thought content normal. Cognition and memory are normal.         Assessment:    Lumbar radiculitis    Procedure:    Bilateral lumbar 5 transforaminal epidurals to injections

## 2017-03-28 NOTE — PROGRESS NOTES
Subjective:      Aris Douglass presents with Follow-Up          Subjective: Mr. 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 The patient has family history of Friedreich's ataxia, apparently genetic testing not covered by insurance.    Regarding today's visit:    The patient notes ongoing pain in the lumbosacral region, radiating pain to the bilateral lower limbs with neuropathic component, worse with activities. The patient notes decreased sensation below the knees.    The patient notes mid back pain, some posterior chest wall pain, without overt radicular component, relatively controlled.    The patient notes intermittent neck pain, controlled, without radicular component.    The patient notes intermittent paresthesias in the hands, primarily on the left.    The patient notes right ankle pain, primarily the lateral aspect. He has had prior ankle sprain in approximately November 2016, seen by orthopedics. The patient reports ankle instability symptoms. The patient had prior right foot fracture is 2014, healed. He has right ankle brace.    The patient has had prior treatment with medications. He is tried therapy/exercise. No bowel/bladder incontinence noted. He is making an effort with home exercise program as tolerated. The ongoing pain limits his ability to function.       MEDICAL RECORDS REVIEW/DATA REVIEW: Reviewed in epic.    I reviewed medications.  Using Percocet intermittently, tolerated, with benefit when used. Tolerating gabapentin and baclofen, with benefit. Uses ibuprofen/NSAIDs intermittently. Previously tried tramadol, not tolerated or effective.    I reviewed  profile 3/28/2017, consistent.     I reviewed diagnostic studies:     I reviewed radiographs. Reviewed MRI lumbar spine 3/2017, images and report, showed disc  protrusion at L5-S1, foraminal stenosis, degenerative disc disease. Reviewed MRI thoracic spine 3/2017 showed mild degenerative disc disease without stenosis. Reviewed thoracolumbar x-rays 3/2017 showed mild degenerative disc disease, mild scoliosis. Reviewed MRI right ankle 3/2017 showed mild swelling, otherwise unremarkable. Reviewed right ankle x-rays 3/2017 showed mild swelling, otherwise unremarkable. Reviewed MRI cervical spine 7/2016. Reviewed MRI brain 7/2016.    I reviewed lab studies. Reviewed labs 2/2016 including CMP, CBC. Reviewed urine drug screen 2/2017, abnormality was positive for Norco, not prescribed, patient notes obtained from nonmedical source, counseled patient regarding this    I reviewed medical issues. Followed by primary care provider, prior records reviewed, and consultants including neurology, cardiology.    I reviewed family history: No neuromuscular disorders noted.    I reviewed social issues. Enjoys working with cars.      PAST MEDICAL HISTORY:   Past Medical History   Diagnosis Date   • No known health problems    • Fracture 2000     right foot/ right ankle       PAST SURGICAL HISTORY:    Past Surgical History   Procedure Laterality Date   • Knee arthroscopy  2000     right knee   • Elbow orif  2001     left elbow   • Percutaneouospinning wrist  2001     left    • Mandible reduction closed  2001       ALLERGIES:  Hydrocodone    MEDICATIONS:    Outpatient Encounter Prescriptions as of 3/28/2017   Medication Sig Dispense Refill   • oxycodone-acetaminophen (PERCOCET) 5-325 MG Tab Take 1/2  to 1 tablets by daily as needed for pain. 30 Tab 0   • baclofen (LIORESAL) 10 MG Tab Take 1 Tab by mouth 2 times a day. 60 Tab 11   • gabapentin (NEURONTIN) 600 MG tablet Take 1 Tab by mouth 3 times a day. 90 Tab 11   • [DISCONTINUED] oxycodone-acetaminophen (PERCOCET) 5-325 MG Tab Take 1/2  to 1 tablets by daily as needed for pain. 20 Tab 0   • ibuprofen (MOTRIN) 600 MG TABS Take 1 Tab by mouth  "every 8 hours as needed for Mild Pain for up to 30 doses. 30 Tab 0     No facility-administered encounter medications on file as of 3/28/2017.       SOCIAL HISTORY:    Social History     Social History   • Marital Status: Single     Spouse Name: N/A   • Number of Children: N/A   • Years of Education: N/A     Social History Main Topics   • Smoking status: Current Every Day Smoker -- 0.25 packs/day for 10 years     Types: Cigarettes   • Smokeless tobacco: Never Used   • Alcohol Use: No   • Drug Use: No   • Sexual Activity: Not Asked     Other Topics Concern   • None     Social History Narrative     No substance use/abuse noted.    Other than perhaps anxiety/depression, adjustment disorder,psychiatric disorder noted    Review of Systems   Constitutional: Negative for fever and chills.   HENT: Negative for hearing loss and tinnitus.    Eyes: Negative for pain and discharge.   Respiratory: Negative for hemoptysis and sputum production.    Cardiovascular: Negative for orthopnea and claudication.   Gastrointestinal: Negative for vomiting and diarrhea.   Genitourinary: Negative for urgency and frequency.   Musculoskeletal: Positive for myalgias, back pain, joint pain and neck pain.   Skin: Negative.    Neurological: Positive for tingling and sensory change.   Endo/Heme/Allergies: Negative.    All other systems reviewed and are negative.        Objective:     /68 mmHg  Pulse 86  Temp(Src) 36.6 °C (97.9 °F)  Ht 1.93 m (6' 3.98\")  Wt 70.761 kg (156 lb)  BMI 19.00 kg/m2  SpO2 100%     Physical Exam  Constitutional: oriented to person, place, and time, appears well-developed and well-nourished.   HEENT: Normocephalic atraumatic, neck supple, no JVD noted, no masses noted, no meningeal signs noted  Lymphadenopathy: no cervical, supraclavicular, or inguinal lymphadenopathy noted  Cardiovascular: Intact distal pulses, including at wrists and ankles, no limb swelling noted  Pulmonary: No tachypnea noted, no accessory " muscle use noted, no dyspnea noted  Abdominal: Soft, nontender, exhibits no distension, no peritoneal signs, no HSM  Musculoskeletal:   Right shoulder: exhibits only mild tenderness. Minimal pain with range of motion testing  Left shoulder: exhibits only mild tenderness. Minimal pain with range of motion testing  Right hip: exhibits only mild tenderness. Minimal pain with range of motion testing  Left hip: exhibits only mild tenderness. Minimal pain with range of motion testing  Cervical back: exhibits mild decreased range of motion, mild tenderness and mild pain. Spurling's testing produces mild axial pain, trigger points noted  Lumbar back: exhibits mild decreased range of motion, mild tenderness and mild pain. straight leg testing produces posterior pelvic/thigh pain bilaterally, trigger points noted, scoliosis noted  Thoracic: Tender with palpation mid back region, pain with range of motion testing, scoliosis noted  Ankle: Tender with palpation about right ankle, prominent lateral malleolus, mild instability with stress testing, no signs of infection  Wrist/hand: mild pain with range of motion testing, negative tinel's at wrist, negative tinel's at elbows  Neurological: oriented to person, place, and time. Cranial nerves grossly intact, normal strength for condition. Sensation intact distally. Reflexes 1+ in upper and lower limbs, Gait antalgic, wide-based, reciprocal, unable to perform heel walk/toe walk, unsteady with Romberg testing with eyes open  Skin: Skin is intact. no rashes or lesions noted  Psychiatric: normal mood and affect. speech is normal and behavior is normal. Judgment and thought content normal. Cognition and memory are normal.         Assessment/Plan:       ASSESSMENT:    1. Low back pain, myofascial pain, lumbar radiculitis, L5-S1 disc protrusion, foraminal stenosis, degenerative disc disease, scoliosis    - Reviewed injection therapy with bilateral lumbar 5 transforaminal epidural steroid  injections, submit authorization request    2. Mid back pain, myofascial pain, mild degenerative disc disease without stenosis, scoliosis    3. Right ankle pain, sprain strain, prominent lateral malleolus, instability symptoms, remote right foot fracture, mild swelling otherwise unremarkable right ankle x-ray/MRI 2/2017.    - Reviewed orthotics/footwear    4. Neck pain, myofascial pain, relatively controlled    5. Friedreich's ataxia, sensory-motor axonal polyneuropathy, neurology following    6. Controlled substance agreement discussed, abnormal urine drug screen 2/2017    - Check results on repeat urine drug screen, provided today  - Obtain updated laboratory studies, including CMP, ordered by neurology      DISCUSSION/PLAN:    - I discussed management options. I reviewed symptomatic care    - I reviewed home exercise program, with medical precautions    - The patient can consider complementary trials with acupuncture, massage therapy, or TENS unit    - I reviewed medication monitoring. I reviewed pain medication dosing, reviewed risks and alternatives. For now, continue current medications. I reviewed medication adjustment.    - Pending clean drug testing, I wrote prescription for the following under the compassionate use provision:     - oxycodone-acetaminophen (PERCOCET) 5-325 MG Tab; Take 1/2  to 1 tablets by daily as needed for pain.  Dispense: 30 Tab; Refill: 0, for intermittent use    - I reviewed risks, side effects, and interactions of medications, including NSAIDs and over-the-counter medications. I reviewed tylenol/acetaminophen dosing.  I reviewed bowel management program. I recommend avoid use of benzodiazepines due to risk of interaction with pain medication. I advise the patient to avoid alcohol use. I reviewed the controlled substance prescribing program. I reviewed further symptomatic medications.    - I reviewed additional diagnostic options, including further/advanced imaging, vascular studies,  and further lab screen    - I reviewed additional therapeutic options, including injection therapy and additional consultative input     - I reviewed psychosocial interventions    - I encourage the patient to stop or decrease cigarette use    - Followup with primary care provider and consultants regarding co-morbid medical isues, as well as for health maintenance/co-management    - I will plan on seeing the patient back in the procedure center for the above noted intervention or in the office where we can further review management options      Please note that this dictation was created using voice recognition software. I have made every reasonable attempt to correct obvious errors but there may be errors of grammar and content that I may have overlooked prior to finalization of this note.

## 2017-04-11 ENCOUNTER — HOSPITAL ENCOUNTER (OUTPATIENT)
Dept: PAIN MANAGEMENT | Facility: REHABILITATION | Age: 32
End: 2017-04-11
Attending: PHYSICAL MEDICINE & REHABILITATION
Payer: MEDICAID

## 2017-04-11 ENCOUNTER — HOSPITAL ENCOUNTER (OUTPATIENT)
Dept: RADIOLOGY | Facility: REHABILITATION | Age: 32
End: 2017-04-11
Attending: PHYSICAL MEDICINE & REHABILITATION
Payer: MEDICAID

## 2017-04-11 VITALS
HEIGHT: 76 IN | OXYGEN SATURATION: 100 % | TEMPERATURE: 99.4 F | SYSTOLIC BLOOD PRESSURE: 107 MMHG | RESPIRATION RATE: 16 BRPM | HEART RATE: 69 BPM | DIASTOLIC BLOOD PRESSURE: 69 MMHG | BODY MASS INDEX: 18.6 KG/M2 | WEIGHT: 152.78 LBS

## 2017-04-11 PROCEDURE — 700117 HCHG RX CONTRAST REV CODE 255

## 2017-04-11 PROCEDURE — 700111 HCHG RX REV CODE 636 W/ 250 OVERRIDE (IP)

## 2017-04-11 PROCEDURE — 64484 NJX AA&/STRD TFRM EPI L/S EA: CPT

## 2017-04-11 PROCEDURE — 64483 NJX AA&/STRD TFRM EPI L/S 1: CPT

## 2017-04-11 RX ORDER — METHYLPREDNISOLONE ACETATE 40 MG/ML
INJECTION, SUSPENSION INTRA-ARTICULAR; INTRALESIONAL; INTRAMUSCULAR; SOFT TISSUE
Status: COMPLETED
Start: 2017-04-11 | End: 2017-04-11

## 2017-04-11 RX ORDER — METHYLPREDNISOLONE ACETATE 80 MG/ML
INJECTION, SUSPENSION INTRA-ARTICULAR; INTRALESIONAL; INTRAMUSCULAR; SOFT TISSUE
Status: COMPLETED
Start: 2017-04-11 | End: 2017-04-11

## 2017-04-11 RX ORDER — LIDOCAINE HYDROCHLORIDE 10 MG/ML
INJECTION, SOLUTION EPIDURAL; INFILTRATION; INTRACAUDAL; PERINEURAL
Status: COMPLETED
Start: 2017-04-11 | End: 2017-04-11

## 2017-04-11 RX ADMIN — IOHEXOL 8 ML: 240 INJECTION, SOLUTION INTRATHECAL; INTRAVASCULAR; INTRAVENOUS; ORAL at 14:22

## 2017-04-11 RX ADMIN — METHYLPREDNISOLONE ACETATE 40 MG: 40 INJECTION, SUSPENSION INTRA-ARTICULAR; INTRALESIONAL; INTRAMUSCULAR; SOFT TISSUE at 14:21

## 2017-04-11 RX ADMIN — LIDOCAINE HYDROCHLORIDE 5 ML: 10 INJECTION, SOLUTION EPIDURAL; INFILTRATION; INTRACAUDAL; PERINEURAL at 14:22

## 2017-04-11 RX ADMIN — LIDOCAINE HYDROCHLORIDE 5 ML: 10 INJECTION, SOLUTION EPIDURAL; INFILTRATION; INTRACAUDAL; PERINEURAL at 14:21

## 2017-04-11 RX ADMIN — METHYLPREDNISOLONE ACETATE 80 MG: 80 INJECTION, SUSPENSION INTRA-ARTICULAR; INTRALESIONAL; INTRAMUSCULAR; SOFT TISSUE at 14:21

## 2017-04-11 ASSESSMENT — PAIN SCALES - GENERAL
PAINLEVEL_OUTOF10: 7
PAINLEVEL_OUTOF10: 10

## 2017-04-11 NOTE — PROGRESS NOTES
Current meds. See medication reconciliation form. Reviewed with pt. Pt denies taking ASA,other blood thinners or anti-inflammatories. Pt has a ride post-procedure (Jean is ). Printed and verbal discharge instructions given to pt who verbalized understanding.

## 2017-04-12 ENCOUNTER — TELEPHONE (OUTPATIENT)
Dept: PHYSICAL MEDICINE AND REHAB | Facility: MEDICAL CENTER | Age: 32
End: 2017-04-12

## 2017-04-12 NOTE — TELEPHONE ENCOUNTER
Aris said the pain was pretty bad after procedure, but he is doing better now. He will keep fv appt

## 2017-04-12 NOTE — PROCEDURES
DATE OF SERVICE:  04/11/2017    DIAGNOSIS:  Lumbar radiculitis.    PROCEDURE:  Bilateral lumbar-5 transforaminal epidural steroid injections.    PROCEDURE NOTE:  Informed consent was obtained and risks and benefits and   alternatives discussed and all questions answered.  Patient was placed prone   on the fluoroscopy assisted area from T12-S4 was prepped in a sterile manner   with povidone-iodine.  The patient was monitored throughout the procedure.    Fluoroscopy was turned on and the left L5-S1 foramen was identified.  That   area was then locally anesthetized with 2 mL of 1% lidocaine.  A 22-gauge   spinal needle was inserted and advanced into the foramen and 1-2 mL Iohexol   dye was injected and allowed visualization of the selective nerve root and   epidural space.    Following negative aspiration, 3 mL of mixture of 120 mg of methylprednisolone   and 4 mL of 1% preservative-free lidocaine was injected at this level.  At   the L5 root level, the patient's pain was reproduced into the left lower limb.    The same procedure was repeated on the contralateral side.  Then, at the L5   nerve root level, the patient's pain was reproduced into the right lower limb.    A total of 2 spinal needles were used for the injections.  There were no   complications.    The patient was transferred to recovery.  Postinjection instructions were   reviewed with the patient.  Then, the patient was discharged to home in the   care of a friend and/or family.       ____________________________________     MD MARY BOLAÑOS / IMELDA    DD:  04/11/2017 15:07:25  DT:  04/11/2017 22:40:26    D#:  [id number]  Job#:  [id number]

## 2017-04-28 ENCOUNTER — OFFICE VISIT (OUTPATIENT)
Dept: PHYSICAL MEDICINE AND REHAB | Facility: MEDICAL CENTER | Age: 32
End: 2017-04-28
Payer: MEDICAID

## 2017-04-28 VITALS
SYSTOLIC BLOOD PRESSURE: 118 MMHG | TEMPERATURE: 98.2 F | HEART RATE: 99 BPM | WEIGHT: 154 LBS | OXYGEN SATURATION: 83 % | HEIGHT: 76 IN | DIASTOLIC BLOOD PRESSURE: 80 MMHG | BODY MASS INDEX: 18.75 KG/M2

## 2017-04-28 DIAGNOSIS — M47.816 SPONDYLOSIS OF LUMBAR REGION WITHOUT MYELOPATHY OR RADICULOPATHY: ICD-10-CM

## 2017-04-28 DIAGNOSIS — G89.29 CHRONIC BILATERAL LOW BACK PAIN, WITH SCIATICA PRESENCE UNSPECIFIED: ICD-10-CM

## 2017-04-28 DIAGNOSIS — Z79.899 CONTROLLED SUBSTANCE AGREEMENT SIGNED: ICD-10-CM

## 2017-04-28 DIAGNOSIS — M54.9 MID BACK PAIN: ICD-10-CM

## 2017-04-28 DIAGNOSIS — M79.2 NERVE PAIN: ICD-10-CM

## 2017-04-28 DIAGNOSIS — G62.9 POLYNEUROPATHY: ICD-10-CM

## 2017-04-28 DIAGNOSIS — R26.9 IMPAIRED GAIT: ICD-10-CM

## 2017-04-28 DIAGNOSIS — M41.9 SCOLIOSIS OF THORACOLUMBAR SPINE, UNSPECIFIED SCOLIOSIS TYPE: ICD-10-CM

## 2017-04-28 DIAGNOSIS — Z71.89 PAIN MEDICATION AGREEMENT DISCUSSED: ICD-10-CM

## 2017-04-28 DIAGNOSIS — M54.5 CHRONIC BILATERAL LOW BACK PAIN, WITH SCIATICA PRESENCE UNSPECIFIED: ICD-10-CM

## 2017-04-28 DIAGNOSIS — M79.18 MYOFASCIAL PAIN: ICD-10-CM

## 2017-04-28 DIAGNOSIS — G11.11 FRIEDREICH ATAXIA (HCC): ICD-10-CM

## 2017-04-28 PROCEDURE — 99214 OFFICE O/P EST MOD 30 MIN: CPT | Performed by: PHYSICAL MEDICINE & REHABILITATION

## 2017-04-28 RX ORDER — OXYCODONE HYDROCHLORIDE AND ACETAMINOPHEN 5; 325 MG/1; MG/1
TABLET ORAL
Qty: 60 TAB | Refills: 0 | Status: SHIPPED | OUTPATIENT
Start: 2017-04-28 | End: 2017-04-28 | Stop reason: SDUPTHER

## 2017-04-28 RX ORDER — OXYCODONE HYDROCHLORIDE AND ACETAMINOPHEN 5; 325 MG/1; MG/1
TABLET ORAL
Qty: 60 TAB | Refills: 0 | Status: SHIPPED | OUTPATIENT
Start: 2017-04-28 | End: 2017-05-24 | Stop reason: SDUPTHER

## 2017-04-28 NOTE — PROGRESS NOTES
Subjective:      Aris Douglass presents with Follow-Up          Subjective: Mr. Peralta returns to the office today for follow-up evaluation of spinal/joints/musculoskeletal pain, also nerve pain.    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     Regarding today's visit:    The patient underwent bilateral lumbar 5 transforaminal epidural steroid injections on 4/11/2017. The patient notes initial postinjection soreness, minimal steroid response, no complications noted.    The patient notes ongoing pain in the lumbosacral region, constant, also lower limb neuropathic pain area. The patient notes the axial lumbosacral pain is most functionally limiting.     The patient notes decreased sensation below the knees, especially with polyneuropathy.    The patient notes intermittent hip pain, relatively controlled.    The patient notes intermittent mid back pain, without overt radicular component, relatively controlled.    The patient notes intermittent neck pain, controlled, without radicular component.    The patient notes intermittent paresthesias in the hands, primarily on the left.    The patient notes right ankle pain, primarily the lateral aspect. He has had prior ankle sprain in approximately November 2016, seen by orthopedics. The patient reports ankle instability symptoms. The patient had prior right foot fracture is 2014, healed. He has right ankle brace.    The patient has had prior treatment with medications. He has tried therapy/exercise. No bowel/bladder incontinence noted. He is making an effort with home exercise program as tolerated. The ongoing lumbosacral pain limits his ability to function.       MEDICAL RECORDS REVIEW/DATA REVIEW: Reviewed in epic.    I reviewed medications.  Using Percocet intermittently, tolerated, with benefit when used, although not adequately controlling pain. Tolerating gabapentin and  baclofen, with benefit. Uses ibuprofen/NSAIDs intermittently. Previously tried tramadol, not tolerated or effective. Previously tried Norco.    I reviewed  profile 4/28/2017, consistent, using [employer] #[id number]. Reviewed control subsequent agreement 4/2017.     I reviewed diagnostic studies:     I reviewed radiographs. Reviewed MRI lumbar spine 3/2017 showed disc protrusion at L5-S1, foraminal stenosis, degenerative disc disease. Reviewed MRI thoracic spine 3/2017 showed mild degenerative disc disease without stenosis. Reviewed thoracolumbar x-rays 3/2017 showed mild degenerative disc disease, mild scoliosis. Reviewed MRI right ankle 3/2017 showed mild swelling, otherwise unremarkable. Reviewed right ankle x-rays 3/2017 showed mild swelling, otherwise unremarkable. Reviewed MRI cervical spine 7/2016. Reviewed MRI brain 7/2016.    I reviewed lab studies. Reviewed labs 2/2016 including CMP, CBC. Reviewed urine drug screen 3/2017, consistent, patient trying to only use Percocet intermittently    I reviewed medical issues. Followed by primary care provider, prior records reviewed, and consultants including neurology, cardiology.    I reviewed family history: Friedreich's ataxia    I reviewed social issues. Enjoys working with cars.      PAST MEDICAL HISTORY:   Past Medical History   Diagnosis Date   • No known health problems    • Fracture 2000     right foot/ right ankle       PAST SURGICAL HISTORY:    Past Surgical History   Procedure Laterality Date   • Knee arthroscopy  2000     right knee   • Elbow orif  2001     left elbow   • Percutaneouospinning wrist  2001     left    • Mandible reduction closed  2001       ALLERGIES:  Hydrocodone    MEDICATIONS:    Outpatient Encounter Prescriptions as of 4/28/2017   Medication Sig Dispense Refill   • oxycodone-acetaminophen (PERCOCET) 5-325 MG Tab Take 1/2  to 1 tablets by daily as needed for pain. 60 Tab 0   • [DISCONTINUED] oxycodone-acetaminophen (PERCOCET) 5-325 MG Tab  Take 1/2  to 1 tablets by daily as needed for pain. 60 Tab 0   • [DISCONTINUED] oxycodone-acetaminophen (PERCOCET) 5-325 MG Tab Take 1/2  to 1 tablets by daily as needed for pain. 60 Tab 0   • [DISCONTINUED] oxycodone-acetaminophen (PERCOCET) 5-325 MG Tab Take 1/2  to 1 tablets by daily as needed for pain. 30 Tab 0   • baclofen (LIORESAL) 10 MG Tab Take 1 Tab by mouth 2 times a day. 60 Tab 11   • gabapentin (NEURONTIN) 600 MG tablet Take 1 Tab by mouth 3 times a day. 90 Tab 11   • ibuprofen (MOTRIN) 600 MG TABS Take 1 Tab by mouth every 8 hours as needed for Mild Pain for up to 30 doses. 30 Tab 0     No facility-administered encounter medications on file as of 4/28/2017.       SOCIAL HISTORY:    Social History     Social History   • Marital Status: Single     Spouse Name: N/A   • Number of Children: N/A   • Years of Education: N/A     Social History Main Topics   • Smoking status: Current Every Day Smoker -- 0.25 packs/day for 10 years     Types: Cigarettes   • Smokeless tobacco: Never Used   • Alcohol Use: No   • Drug Use: No   • Sexual Activity: Not Asked     Other Topics Concern   • None     Social History Narrative     No substance use/abuse noted.    Other than perhaps anxiety/depression, adjustment disorder,psychiatric disorder noted    Review of Systems   Constitutional: Negative for fever and chills.   HENT: Negative for hearing loss and tinnitus.    Eyes: Negative for pain and discharge.   Respiratory: Negative for hemoptysis and sputum production.    Cardiovascular: Negative for orthopnea and claudication.   Gastrointestinal: Negative for vomiting and diarrhea.   Genitourinary: Negative for urgency and frequency.   Musculoskeletal: Positive for myalgias, back pain, joint pain and neck pain.   Skin: Negative.    Neurological: Positive for tingling and sensory change.   Endo/Heme/Allergies: Negative.    All other systems reviewed and are negative.        Objective:     /80 mmHg  Pulse 99  Temp(Src)  "36.8 °C (98.2 °F)  Ht 1.93 m (6' 3.98\")  Wt 69.854 kg (154 lb)  BMI 18.75 kg/m2  SpO2 83%     Physical Exam  Constitutional: oriented to person, place, and time, appears well-developed and well-nourished.   HEENT: Normocephalic atraumatic, neck supple, no JVD noted, no masses noted, no meningeal signs noted  Lymphadenopathy: no cervical, supraclavicular, or inguinal lymphadenopathy noted  Cardiovascular: Intact distal pulses, including at wrists and ankles, no limb swelling noted  Pulmonary: No tachypnea noted, no accessory muscle use noted, no dyspnea noted  Abdominal: Soft, nontender, exhibits no distension, no peritoneal signs, no HSM  Musculoskeletal:   Right shoulder: exhibits only mild tenderness. Minimal pain with range of motion testing  Left shoulder: exhibits only mild tenderness. Minimal pain with range of motion testing  Right hip: exhibits only mild tenderness. Minimal pain with range of motion testing  Left hip: exhibits only mild tenderness. Minimal pain with range of motion testing  Cervical back: exhibits mild decreased range of motion, mild tenderness and mild pain. Spurling's testing produces mild axial pain, trigger points noted  Lumbar back: exhibits mild decreased range of motion, mild tenderness and mild pain. straight leg testing negative, trigger points noted, scoliosis noted, pain noted with quadrant loading and extension bilaterally, reproducing pain, mild tenderness and sacroiliac joint region  Thoracic: Tender with palpation mid back region, pain with range of motion testing, scoliosis noted  Ankle: Tender with palpation about right ankle, prominent lateral malleolus, mild instability with stress testing, no signs of infection  Wrist/hand: mild pain with range of motion testing, negative tinel's at wrist, negative tinel's at elbows  Neurological: oriented to person, place, and time. Cranial nerves grossly intact, normal strength for condition. Sensation intact distally. Reflexes 1+ " in upper and lower limbs, Gait antalgic, wide-based, reciprocal, unable to perform heel walk/toe walk, unsteady with Romberg testing with eyes open  Skin: Skin is intact. no rashes or lesions noted  Psychiatric: normal mood and affect. speech is normal and behavior is normal. Judgment and thought content normal. Cognition and memory are normal.         Assessment/Plan:       ASSESSMENT:    1. Low back pain, myofascial pain, lumbar radiculitis, L5-S1 disc protrusion, foraminal stenosis, degenerative disc disease, lumbar spondylosis, scoliosis, consider lumbar facet syndrome    - Reviewed injection therapy with bilateral lumbar 3, 4, and 5 medial branch blocks, facet blocks, as diagnostic/therapeutic intervention for suspected facetogenic pain    2. Mid back pain, myofascial pain, mild degenerative disc disease without stenosis, scoliosis, relatively controlled    3. Right ankle pain, sprain strain, prominent lateral malleolus, instability symptoms, remote right foot fracture, mild swelling otherwise unremarkable right ankle x-ray/MRI 2/2017, orthopedics consulted.    - Reviewed orthotics/footwear    4. Neck pain, myofascial pain, relatively controlled    5. Friedreich's ataxia, sensory-motor axonal polyneuropathy, lower limb nerve pain, neurology following    6. Controlled substance agreement signed    - Obtain updated laboratory studies, including CMP, updated order for CMP      DISCUSSION/PLAN:    - I discussed management options. I reviewed symptomatic care    - I reviewed home exercise program, with medical precautions. I reviewed activity modification.    - The patient can consider complementary trials with acupuncture, massage therapy, or TENS unit    - I reviewed medication monitoring. I reviewed pain medication dosing, reviewed risks and alternatives. For now, continue current medications. I reviewed medication adjustment, as current pain not adequately controlled.      - oxycodone-acetaminophen (PERCOCET)  5-325 MG Tab; Take 1/2  to 1 tablets by mouth twice per day as needed for pain.  Dispense: 60 Tab; Refill: 0, this is an increase in dosing/quantity, 3 prescriptions written for 3 months supply    - Review/consider further trial of neuropathic medications    - I reviewed risks, side effects, and interactions of medications, including NSAIDs and over-the-counter medications. I reviewed tylenol/acetaminophen dosing.  I reviewed bowel management program. I recommend avoid use of benzodiazepines due to risk of interaction with pain medication. I advise the patient to avoid alcohol use. I reviewed the controlled substance prescribing program. I reviewed further symptomatic medications.    - I reviewed additional diagnostic options, including further/advanced imaging, vascular studies, and further lab screen    - I reviewed additional therapeutic options, including further injection therapy and additional consultative input     - I reviewed psychosocial interventions    - I encourage the patient to stop or decrease cigarette use    - Followup with primary care provider and consultants regarding co-morbid medical isues, as well as for health maintenance/co-management    - Return in 3 months or an as-needed basis. Additionally, coordinate follow-up after getting above-noted injection authorization in place.      Please note that this dictation was created using voice recognition software. I have made every reasonable attempt to correct obvious errors but there may be errors of grammar and content that I may have overlooked prior to finalization of this note.

## 2017-04-28 NOTE — MR AVS SNAPSHOT
"        Aris Douglass   2017 10:45 AM   Office Visit   MRN: [id number]    Department:  [facility]   Dept Phone:  [phone]    Description:  Male : 1985   Provider:  Timur De Leon M.D.           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CMS[facility], located within [facility])   [107574]       Impaired gait   [583264]       Mid back pain   [795885]       Chronic bilateral low back pain, with sciatica presence unspecified   [4178158]       Scoliosis of thoracolumbar spine, unspecified scoliosis type   [2592037]       Pain medication agreement discussed   [206293]       Controlled substance agreement signed   [[id number]]       Spondylosis of lumbar region without myelopathy or radiculopathy   [343711]       Polyneuropathy ([facility], located [facility])   [479625]       Nerve pain   [480902]       Myofascial pain   [167671]         Vital Signs     Blood Pressure Pulse Temperature Height Weight Body Mass Index    118/80 mmHg 99 36.8 °C (98.2 °F) 1.93 m (6' 3.98\") 69.854 kg (154 lb) 18.75 kg/m2    Oxygen Saturation Smoking Status                83% Current Every Day Smoker          Basic Information     Date Of Birth Sex Race Ethnicity Preferred Language    1985 Male White Non- English      Your appointments     2017  2:45 PM   Follow Up Visit with Timur De Leon M.D.   [facility] PHYSIAT[facility] (--)    [address] [facility] [[email or web address]]   [facility] [id number] [phone]           You will be receiving a confirmation call a few days before your appointment from our automated call confirmation system.            2017 11:00 AM   [facility]    [address] [facility] [[email or web address]]   [place] [id number] [phone]           You will be receiving a confirmation call a few days before your appointment from our automated call " confirmation system.              Problem List              ICD-10-CM Priority Class Noted - Resolved    Metatarsal fracture S92.309A   6/19/2014 - Present      Health Maintenance        Date Due Completion Dates    IMM DTaP/Tdap/Td Vaccine (1 - Tdap) 7/23/2005 ---            Current Immunizations     No immunizations on file.      Below and/or attached are the medications your provider expects you to take. Review all of your home medications and newly ordered medications with your provider and/or pharmacist. Follow medication instructions as directed by your provider and/or pharmacist. Please keep your medication list with you and share with your provider. Update the information when medications are discontinued, doses are changed, or new medications (including over-the-counter products) are added; and carry medication information at all times in the event of emergency situations     Allergies:  HYDROCODONE - Itching               Medications  Valid as of: April 28, 2017 - 11:49 AM    Generic Name Brand Name Tablet Size Instructions for use    Baclofen (Tab) LIORESAL 10 MG Take 1 Tab by mouth 2 times a day.        Gabapentin (Tab) NEURONTIN 600 MG Take 1 Tab by mouth 3 times a day.        Ibuprofen (Tab) MOTRIN 600 MG Take 1 Tab by mouth every 8 hours as needed for Mild Pain for up to 30 doses.        Oxycodone-Acetaminophen (Tab) PERCOCET 5-325 MG Take 1/2  to 1 tablets by daily as needed for pain.        .                 Medicines prescribed today were sent to:     [facility] [address] [address]    Phone: [phone] Fax: [phone]    Open 24 Hours?: No      Medication refill instructions:       If your prescription bottle indicates you have medication refills left, it is not necessary to call your provider’s office. Please contact your pharmacy and they will refill your medication.    If your prescription bottle indicates  you do not have any refills left, you may request refills at any time through one of the following ways: The online [employer] system (except Urgent Care), by calling your provider’s office, or by asking your pharmacy to contact your provider’s office with a refill request. Medication refills are processed only during regular business hours and may not be available until the next business day. Your provider may request additional information or to have a follow-up visit with you prior to refilling your medication.   *Please Note: Medication refills are assigned a new Rx number when refilled electronically. Your pharmacy may indicate that no refills were authorized even though a new prescription for the same medication is available at the pharmacy. Please request the medicine by name with the pharmacy before contacting your provider for a refill.        Your To Do List     Future Labs/Procedures Complete By Expires    COMP METABOLIC PANEL  As directed 4/28/2018         [employer] Access Code: [email or web address]  Expires: 5/28/2017 11:49 AM    [employer]  A secure, online tool to manage your health information     [employer]® is a secure, online tool that connects you to your personalized health information from the privacy of your home -- day or night - making it very easy for you to manage your healthcare. Once the activation process is completed, you can even access your medical information using the [employer] leon, which is available for free in the Apple Leon store or Google Play store.     [employer] provides the following levels of access (as shown below):   My Chart Features   [facility]  Specialists [facility]  Urgent  Care Non-[facility]  Primary Care  Doctor   Email your healthcare team securely and privately 24/7 X X X    Manage appointments: schedule your next appointment; view details of past/upcoming appointments X      Request prescription refills. X      View recent personal medical  records, including lab and immunizations X X X X   View health record, including health history, allergies, medications X X X X   Read reports about your outpatient visits, procedures, consult and ER notes X X X X   See your discharge summary, which is a recap of your hospital and/or ER visit that includes your diagnosis, lab results, and care plan. X X       How to register for [employer]:  1. Go to  [email or web address].  2. Click on the Sign Up Now box, which takes you to the New Member Sign Up page. You will need to provide the following information:  a. Enter your [employer] Access Code exactly as it appears at the top of this page. (You will not need to use this code after you’ve completed the sign-up process. If you do not sign up before the expiration date, you must request a new code.)   b. Enter your date of birth.   c. Enter your home email address.   d. Click Submit, and follow the next screen’s instructions.  3. Create a [employer] ID. This will be your [employer] login ID and cannot be changed, so think of one that is secure and easy to remember.  4. Create a [employer] password. You can change your password at any time.  5. Enter your Password Reset Question and Answer. This can be used at a later time if you forget your password.   6. Enter your e-mail address. This allows you to receive e-mail notifications when new information is available in [employer].  7. Click Sign Up. You can now view your health information.    For assistance activating your [employer] account, call [phone]        Quit Tobacco Information     Do you want to quit using tobacco?    Quitting tobacco decreases risks of cancer, heart and lung disease, increases life expectancy, improves sense of taste and smell, and increases spending money, among other benefits.    If you are thinking about quitting, we can help.  • [facility]: [phone]  o Program occurs weekly for four weeks and includes pharmacist consultation on  products to support quitting smoking or chewing tobacco. A provider referral is needed for pharmacist consultation.  • Tobacco Users Help Hotline: 0-800QUIT-NOW (555-4059) or https://[place].quitlogix.org/  o Free, confidential telephone and online coaching for [place] residents. Sessions are designed on a schedule that is convenient for you. Eligible clients receive free nicotine replacement therapy.  • Nationally: www.smokefree.gov  o Information and professional assistance to support both immediate and long-term needs as you become, and remain, a non-smoker. Smokefree.gov allows you to choose the help that best fits your needs.

## 2017-04-28 NOTE — PROGRESS NOTES
Special Procedures H&P:      Subjective: Mr. Peralta notes ongoing pain in the lumbosacral region, constant, also lower limb neuropathic pain area. The patient notes the axial lumbosacral pain is most functionally limiting.     MEDICAL RECORDS REVIEW/DATA REVIEW: Reviewed in epic.    I reviewed medications.      I reviewed diagnostic studies:     I reviewed radiographs. Reviewed MRI lumbar spine 3/2017 showed disc protrusion at L5-S1, foraminal stenosis, degenerative disc disease. Reviewed MRI thoracic spine 3/2017 showed mild degenerative disc disease without stenosis. Reviewed thoracolumbar x-rays 3/2017 showed mild degenerative disc disease, mild scoliosis.     I reviewed lab studies. Reviewed labs 2/2016 including CMP, CBC. Reviewed urine drug screen 3/2017, consistent, patient trying to only use Percocet intermittently    I reviewed medical issues. 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 Followed by primary care provider, prior records reviewed, and consultants including neurology, cardiology.    I reviewed family history: Friedreich's ataxia    I reviewed social issues. Enjoys working with cars.    PAST MEDICAL HISTORY:    Past Medical History     Past Medical History    Diagnosis  Date    •  No known health problems      •  Fracture  2000        right foot/ right ankle           PAST SURGICAL HISTORY:     Past Surgical History     Past Surgical History    Procedure  Laterality  Date    •  Knee arthroscopy    2000        right knee    •  Elbow orif    2001        left elbow    •  Percutaneouospinning wrist    2001        left     •  Mandible reduction closed    2001           ALLERGIES:  Hydrocodone    MEDICATIONS:     Encounter Medications     Outpatient Encounter Prescriptions as of 4/28/2017    Medication  Sig  Dispense  Refill    •  oxycodone-acetaminophen (PERCOCET) 5-325 MG Tab  Take 1/2  to 1 tablets by  daily as needed for pain.  60 Tab  0    •  [DISCONTINUED] oxycodone-acetaminophen (PERCOCET) 5-325 MG Tab  Take 1/2  to 1 tablets by daily as needed for pain.  60 Tab  0    •  [DISCONTINUED] oxycodone-acetaminophen (PERCOCET) 5-325 MG Tab  Take 1/2  to 1 tablets by daily as needed for pain.  60 Tab  0    •  [DISCONTINUED] oxycodone-acetaminophen (PERCOCET) 5-325 MG Tab  Take 1/2  to 1 tablets by daily as needed for pain.  30 Tab  0    •  baclofen (LIORESAL) 10 MG Tab  Take 1 Tab by mouth 2 times a day.  60 Tab  11    •  gabapentin (NEURONTIN) 600 MG tablet  Take 1 Tab by mouth 3 times a day.  90 Tab  11    •  ibuprofen (MOTRIN) 600 MG TABS  Take 1 Tab by mouth every 8 hours as needed for Mild Pain for up to 30 doses.  30 Tab  0        No facility-administered encounter medications on file as of 4/28/2017.           SOCIAL HISTORY:     Social History     Social History        Social History    •  Marital Status:  Single        Spouse Name:  N/A    •  Number of Children:  N/A    •  Years of Education:  N/A        Social History Main Topics    •  Smoking status:  Current Every Day Smoker -- 0.25 packs/day for 10 years        Types:  Cigarettes    •  Smokeless tobacco:  Never Used    •  Alcohol Use:  No    •  Drug Use:  No    •  Sexual Activity:  Not Asked        Other Topics  Concern    •  None        Social History Narrative         No substance use/abuse noted.    Other than perhaps anxiety/depression, adjustment disorder,psychiatric disorder noted    Review of Systems   Constitutional: Negative for fever and chills.   HENT: Negative for hearing loss and tinnitus.    Eyes: Negative for pain and discharge.   Respiratory: Negative for hemoptysis and sputum production.    Cardiovascular: Negative for orthopnea and claudication.   Gastrointestinal: Negative for vomiting and diarrhea.   Genitourinary: Negative for urgency and frequency.   Musculoskeletal: Positive for myalgias, back pain, joint pain and neck pain.   Skin:  "Negative.    Neurological: Positive for tingling and sensory change.   Endo/Heme/Allergies: Negative.    All other systems reviewed and are negative.          Objective:      /80 mmHg  Pulse 99  Temp(Src) 36.8 °C (98.2 °F)  Ht 1.93 m (6' 3.98\")  Wt 69.854 kg (154 lb)  BMI 18.75 kg/m2  SpO2 83%     Physical Exam  Constitutional: oriented to person, place, and time, appears well-developed and well-nourished.    HEENT: Normocephalic atraumatic, neck supple, no JVD noted, no masses noted, no meningeal signs noted  Lymphadenopathy: no cervical, supraclavicular, or inguinal lymphadenopathy noted  Cardiovascular: Intact distal pulses, including at wrists and ankles, no limb swelling noted  Pulmonary: No tachypnea noted, no accessory muscle use noted, no dyspnea noted  Abdominal: Soft, nontender, exhibits no distension, no peritoneal signs, no HSM  Musculoskeletal:   Right shoulder: exhibits only mild tenderness. Minimal pain with range of motion testing  Left shoulder: exhibits only mild tenderness. Minimal pain with range of motion testing  Right hip: exhibits only mild tenderness. Minimal pain with range of motion testing  Left hip: exhibits only mild tenderness. Minimal pain with range of motion testing  Cervical back: exhibits mild decreased range of motion, mild tenderness and mild pain. Spurling's testing produces mild axial pain, trigger points noted  Lumbar back: exhibits mild decreased range of motion, mild tenderness and mild pain. straight leg testing negative, trigger points noted, scoliosis noted, pain noted with quadrant loading and extension bilaterally, reproducing pain, mild tenderness and sacroiliac joint region  Thoracic: Tender with palpation mid back region, pain with range of motion testing, scoliosis noted  Ankle: Tender with palpation about right ankle, prominent lateral malleolus, mild instability with stress testing, no signs of infection  Wrist/hand: mild pain with range of motion " testing, negative tinel's at wrist, negative tinel's at elbows  Neurological: oriented to person, place, and time. Cranial nerves grossly intact, normal strength for condition. Sensation intact distally. Reflexes 1+ in upper and lower limbs, Gait antalgic, wide-based, reciprocal, unable to perform heel walk/toe walk, unsteady with Romberg testing with eyes open  Skin: Skin is intact. no rashes or lesions noted  Psychiatric: normal mood and affect. speech is normal and behavior is normal. Judgment and thought content normal. Cognition and memory are normal.         Assessment:    Lumbar spondylosis, suspect lumbar facet syndrome    Plan:    Bilateral lumbar 3, 4, and 5 medial branch blocks, facet blocks

## 2017-05-10 ENCOUNTER — HOSPITAL ENCOUNTER (OUTPATIENT)
Dept: LAB | Facility: MEDICAL CENTER | Age: 32
End: 2017-05-10
Attending: PHYSICAL MEDICINE & REHABILITATION
Payer: MEDICAID

## 2017-05-10 DIAGNOSIS — M41.9 SCOLIOSIS OF THORACOLUMBAR SPINE, UNSPECIFIED SCOLIOSIS TYPE: ICD-10-CM

## 2017-05-10 DIAGNOSIS — Z79.899 CONTROLLED SUBSTANCE AGREEMENT SIGNED: ICD-10-CM

## 2017-05-10 DIAGNOSIS — R26.9 IMPAIRED GAIT: ICD-10-CM

## 2017-05-10 DIAGNOSIS — M54.5 CHRONIC BILATERAL LOW BACK PAIN, WITH SCIATICA PRESENCE UNSPECIFIED: ICD-10-CM

## 2017-05-10 DIAGNOSIS — G89.29 CHRONIC BILATERAL LOW BACK PAIN, WITH SCIATICA PRESENCE UNSPECIFIED: ICD-10-CM

## 2017-05-10 DIAGNOSIS — G11.11 FRIEDREICH ATAXIA (HCC): ICD-10-CM

## 2017-05-10 DIAGNOSIS — M54.9 MID BACK PAIN: ICD-10-CM

## 2017-05-10 LAB
ALBUMIN SERPL BCP-MCNC: 4.7 G/DL (ref 3.2–4.9)
ALBUMIN/GLOB SERPL: 2 G/DL
ALP SERPL-CCNC: 57 U/L (ref 30–99)
ALT SERPL-CCNC: 16 U/L (ref 2–50)
ANION GAP SERPL CALC-SCNC: 8 MMOL/L (ref 0–11.9)
AST SERPL-CCNC: 22 U/L (ref 12–45)
BILIRUB SERPL-MCNC: 0.6 MG/DL (ref 0.1–1.5)
BUN SERPL-MCNC: 8 MG/DL (ref 8–22)
CALCIUM SERPL-MCNC: 9.5 MG/DL (ref 8.5–10.5)
CHLORIDE SERPL-SCNC: 104 MMOL/L (ref 96–112)
CO2 SERPL-SCNC: 27 MMOL/L (ref 20–33)
CREAT SERPL-MCNC: 0.97 MG/DL (ref 0.5–1.4)
GFR SERPL CREATININE-BSD FRML MDRD: >60 ML/MIN/1.73 M 2
GLOBULIN SER CALC-MCNC: 2.3 G/DL (ref 1.9–3.5)
GLUCOSE SERPL-MCNC: 129 MG/DL (ref 65–99)
POTASSIUM SERPL-SCNC: 3.6 MMOL/L (ref 3.6–5.5)
PROT SERPL-MCNC: 7 G/DL (ref 6–8.2)
SODIUM SERPL-SCNC: 139 MMOL/L (ref 135–145)

## 2017-05-10 PROCEDURE — 36415 COLL VENOUS BLD VENIPUNCTURE: CPT

## 2017-05-10 PROCEDURE — 80053 COMPREHEN METABOLIC PANEL: CPT

## 2017-05-23 ENCOUNTER — HOSPITAL ENCOUNTER (OUTPATIENT)
Dept: RADIOLOGY | Facility: REHABILITATION | Age: 32
End: 2017-05-23
Attending: PHYSICAL MEDICINE & REHABILITATION
Payer: MEDICAID

## 2017-05-23 ENCOUNTER — HOSPITAL ENCOUNTER (OUTPATIENT)
Dept: PAIN MANAGEMENT | Facility: REHABILITATION | Age: 32
End: 2017-05-23
Attending: PHYSICAL MEDICINE & REHABILITATION
Payer: MEDICAID

## 2017-05-23 VITALS
DIASTOLIC BLOOD PRESSURE: 70 MMHG | SYSTOLIC BLOOD PRESSURE: 121 MMHG | RESPIRATION RATE: 18 BRPM | WEIGHT: 151.24 LBS | HEIGHT: 76 IN | OXYGEN SATURATION: 97 % | BODY MASS INDEX: 18.42 KG/M2 | TEMPERATURE: 99.3 F | HEART RATE: 74 BPM

## 2017-05-23 PROCEDURE — 700111 HCHG RX REV CODE 636 W/ 250 OVERRIDE (IP)

## 2017-05-23 PROCEDURE — 64493 INJ PARAVERT F JNT L/S 1 LEV: CPT

## 2017-05-23 PROCEDURE — 700117 HCHG RX CONTRAST REV CODE 255

## 2017-05-23 PROCEDURE — 64494 INJ PARAVERT F JNT L/S 2 LEV: CPT

## 2017-05-23 PROCEDURE — 700101 HCHG RX REV CODE 250

## 2017-05-23 PROCEDURE — 64495 INJ PARAVERT F JNT L/S 3 LEV: CPT

## 2017-05-23 RX ORDER — BUPIVACAINE HYDROCHLORIDE 5 MG/ML
INJECTION, SOLUTION EPIDURAL; INTRACAUDAL
Status: COMPLETED
Start: 2017-05-23 | End: 2017-05-23

## 2017-05-23 RX ORDER — LIDOCAINE HYDROCHLORIDE 10 MG/ML
INJECTION, SOLUTION EPIDURAL; INFILTRATION; INTRACAUDAL; PERINEURAL
Status: COMPLETED
Start: 2017-05-23 | End: 2017-05-23

## 2017-05-23 RX ORDER — METHYLPREDNISOLONE ACETATE 80 MG/ML
INJECTION, SUSPENSION INTRA-ARTICULAR; INTRALESIONAL; INTRAMUSCULAR; SOFT TISSUE
Status: COMPLETED
Start: 2017-05-23 | End: 2017-05-23

## 2017-05-23 RX ADMIN — LIDOCAINE HYDROCHLORIDE 7 ML: 10 INJECTION, SOLUTION EPIDURAL; INFILTRATION; INTRACAUDAL; PERINEURAL at 14:34

## 2017-05-23 RX ADMIN — LIDOCAINE HYDROCHLORIDE 3 ML: 10 INJECTION, SOLUTION EPIDURAL; INFILTRATION; INTRACAUDAL; PERINEURAL at 14:36

## 2017-05-23 RX ADMIN — METHYLPREDNISOLONE ACETATE 80 MG: 80 INJECTION, SUSPENSION INTRA-ARTICULAR; INTRALESIONAL; INTRAMUSCULAR; SOFT TISSUE at 14:36

## 2017-05-23 RX ADMIN — BUPIVACAINE HYDROCHLORIDE 3 ML: 5 INJECTION, SOLUTION EPIDURAL; INTRACAUDAL; PERINEURAL at 14:36

## 2017-05-23 RX ADMIN — IOHEXOL 1 ML: 240 INJECTION, SOLUTION INTRATHECAL; INTRAVASCULAR; INTRAVENOUS; ORAL at 14:36

## 2017-05-23 ASSESSMENT — PAIN SCALES - GENERAL
PAINLEVEL_OUTOF10: 7

## 2017-05-23 NOTE — PROGRESS NOTES
Pt positioned prone by RN. Arms hanging to the side of table, hands resting on stool . Pillow placed under feet and lower legs by RN.

## 2017-05-23 NOTE — PROGRESS NOTES
Medication reconciliation reviewed with patient denied taking any blood thinners  and any anti- inflammatories medication. Discharge instruction given to patient and verbalized understanding. Patient  has ride home ( [place]/ friend /  ). Dr. De Leon notified.

## 2017-05-24 ENCOUNTER — TELEPHONE (OUTPATIENT)
Dept: PHYSICAL MEDICINE AND REHAB | Facility: MEDICAL CENTER | Age: 32
End: 2017-05-24

## 2017-05-24 DIAGNOSIS — G11.11 FRIEDREICH ATAXIA (HCC): ICD-10-CM

## 2017-05-24 DIAGNOSIS — M54.9 MID BACK PAIN: ICD-10-CM

## 2017-05-24 DIAGNOSIS — M54.5 CHRONIC BILATERAL LOW BACK PAIN, WITH SCIATICA PRESENCE UNSPECIFIED: ICD-10-CM

## 2017-05-24 DIAGNOSIS — Z79.899 CONTROLLED SUBSTANCE AGREEMENT SIGNED: ICD-10-CM

## 2017-05-24 DIAGNOSIS — R26.9 IMPAIRED GAIT: ICD-10-CM

## 2017-05-24 DIAGNOSIS — M41.9 SCOLIOSIS OF THORACOLUMBAR SPINE, UNSPECIFIED SCOLIOSIS TYPE: ICD-10-CM

## 2017-05-24 DIAGNOSIS — G89.29 CHRONIC BILATERAL LOW BACK PAIN, WITH SCIATICA PRESENCE UNSPECIFIED: ICD-10-CM

## 2017-05-24 RX ORDER — OXYCODONE HYDROCHLORIDE AND ACETAMINOPHEN 5; 325 MG/1; MG/1
TABLET ORAL
Qty: 60 TAB | Refills: 0 | Status: SHIPPED | OUTPATIENT
Start: 2017-05-24 | End: 2017-07-26

## 2017-05-24 RX ORDER — OXYCODONE HYDROCHLORIDE AND ACETAMINOPHEN 5; 325 MG/1; MG/1
TABLET ORAL
Qty: 60 TAB | Refills: 0 | Status: SHIPPED | OUTPATIENT
Start: 2017-05-24 | End: 2017-06-07 | Stop reason: SDUPTHER

## 2017-05-24 NOTE — TELEPHONE ENCOUNTER
Aris came in to exchange Rx's and he said he was much better this time from procedure. He will keep fv appt.

## 2017-05-24 NOTE — PROCEDURES
DATE OF SERVICE:  05/23/2017    DIAGNOSES:  Lumbar spondylosis, suspect lumbar facet syndrome.    PROCEDURE:  Bilateral lumbar 3, 4, and 5 medial branch blocks (facet blocks).    PROCEDURE NOTE:  Informed consent was obtained.  Risks, benefits, and   alternatives discussed and all questions answered.  The patient was placed   prone on the fluoroscopy table and the skin area from T12-S4 was prepared in a   sterile manner with povidone-iodine.  The patient was monitored throughout   the procedure.    Fluoroscopy was turned on and positioned to identify the sacral ala and the   transverse process on left side of the L4 and L5 vertebra.  Those areas were   then locally anesthetized with 2 mL of 1% preservative-free lidocaine.    Using fluoroscopy, 22-gauge spinal needles were placed at the superomedial   border of the transverse process and vertebral body at L4, L5 and the sacral   ala, confirmed on multiplanar imaging.  0.5-1 mL of Iohexol Omnipaque dye   was injected to verify location and ensure nonvascular flow.  Then, following   negative aspiration, 1 mL of mixture of methylprednisolone 80 mg/ml, 2.5 mL of 1%  preservative-free lidocaine, and 2.5 mL of 0.5% preservative-free bupivacaine  was injected at each site.  The same procedure was repeated on the   contralateral side.  A total of 2 spinal needles were used for the medial   branch blocks.  There were no complications.    The patient was transported to recovery.  The patient reported preprocedure   pain of 7/10 and post-procedure pain of 7/10.  Postinjection instructions were   reviewed with the patient and the patient was discharged to home in the care   of a friend and/or family member.       ____________________________________     MD MARY BOLAÑOS / IMELDA    DD:  05/23/2017 15:08:32  DT:  05/24/2017 00:41:33    D#:  [id number]  Job#:  [id number]

## 2017-05-24 NOTE — TELEPHONE ENCOUNTER
Aris called and said the pharmacy will not fill percocet 5/27/17 because of the way the Rx is written. Insurance will not cover because it was written as two month supply, Qty 60. I Checked with Sebastián at [facility] and he said the same thing. I let him know I will check with Dr. De Leon to see if he can re-write to be filled 5/27/17

## 2017-05-24 NOTE — TELEPHONE ENCOUNTER
"Dr. De Leon wrote, \" The patient will need to bring the original Percocet prescription in, and I can re-write\"    I let Aris know and he will bring in about 4:20pm today.   "

## 2017-05-24 NOTE — PROGRESS NOTES
Updated prescriptions for Percocet for fill dates on 5/27/2017 and 6/25/2017, corrected sig per pharmacy request, patient returned original prescriptions, shredded. Reviewed  profile 5/24/2017.

## 2017-06-07 ENCOUNTER — OFFICE VISIT (OUTPATIENT)
Dept: PHYSICAL MEDICINE AND REHAB | Facility: MEDICAL CENTER | Age: 32
End: 2017-06-07
Payer: MEDICAID

## 2017-06-07 VITALS
HEIGHT: 76 IN | HEART RATE: 90 BPM | SYSTOLIC BLOOD PRESSURE: 118 MMHG | OXYGEN SATURATION: 96 % | BODY MASS INDEX: 17.9 KG/M2 | DIASTOLIC BLOOD PRESSURE: 78 MMHG | WEIGHT: 147 LBS | TEMPERATURE: 98.2 F

## 2017-06-07 DIAGNOSIS — Z79.899 CONTROLLED SUBSTANCE AGREEMENT SIGNED: ICD-10-CM

## 2017-06-07 DIAGNOSIS — M41.9 SCOLIOSIS OF THORACOLUMBAR SPINE, UNSPECIFIED SCOLIOSIS TYPE: ICD-10-CM

## 2017-06-07 DIAGNOSIS — M54.9 MID BACK PAIN: ICD-10-CM

## 2017-06-07 DIAGNOSIS — G89.29 CHRONIC BILATERAL LOW BACK PAIN, WITH SCIATICA PRESENCE UNSPECIFIED: ICD-10-CM

## 2017-06-07 DIAGNOSIS — M79.18 MYOFASCIAL PAIN: ICD-10-CM

## 2017-06-07 DIAGNOSIS — G11.11 FRIEDREICH ATAXIA (HCC): ICD-10-CM

## 2017-06-07 DIAGNOSIS — M79.2 NERVE PAIN: ICD-10-CM

## 2017-06-07 DIAGNOSIS — M54.5 CHRONIC BILATERAL LOW BACK PAIN, WITH SCIATICA PRESENCE UNSPECIFIED: ICD-10-CM

## 2017-06-07 DIAGNOSIS — M47.816 SPONDYLOSIS OF LUMBAR REGION WITHOUT MYELOPATHY OR RADICULOPATHY: ICD-10-CM

## 2017-06-07 DIAGNOSIS — R26.9 IMPAIRED GAIT: ICD-10-CM

## 2017-06-07 PROCEDURE — 99214 OFFICE O/P EST MOD 30 MIN: CPT | Performed by: PHYSICAL MEDICINE & REHABILITATION

## 2017-06-07 RX ORDER — GABAPENTIN 300 MG/1
300 CAPSULE ORAL 3 TIMES DAILY
Qty: 90 CAP | Refills: 5 | Status: SHIPPED | OUTPATIENT
Start: 2017-06-07 | End: 2017-07-26

## 2017-06-07 RX ORDER — OXYCODONE HYDROCHLORIDE AND ACETAMINOPHEN 5; 325 MG/1; MG/1
TABLET ORAL
Qty: 60 TAB | Refills: 0 | Status: SHIPPED | OUTPATIENT
Start: 2017-06-07 | End: 2017-07-26 | Stop reason: SDUPTHER

## 2017-06-07 RX ORDER — LIDOCAINE HYDROCHLORIDE 10 MG/ML
INJECTION, SOLUTION EPIDURAL; INFILTRATION; INTRACAUDAL; PERINEURAL
COMMUNITY
Start: 2017-05-23 | End: 2017-11-27

## 2017-06-07 NOTE — MR AVS SNAPSHOT
"        Aris Douglass   2017 2:45 PM   Office Visit   MRN: [id number]    Department:  [facility]   Dept Phone:  [phone]    Description:  Male : 1985   Provider:  Timur De Leon M.D.           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[facility])   [683086]       Impaired gait   [996731]       Mid back pain   [006633]       Chronic bilateral low back pain, with sciatica presence unspecified   [3057533]       Scoliosis of thoracolumbar spine, unspecified scoliosis type   [4005733]       Controlled substance agreement signed   [[id number]]       Nerve pain   [611773]         Vital Signs     Blood Pressure Pulse Temperature Height Weight Body Mass Index    118/78 mmHg 90 36.8 °C (98.2 °F) 1.93 m (6' 4\") 66.679 kg (147 lb) 17.90 kg/m2    Oxygen Saturation Smoking Status                96% Current Every Day Smoker          Basic Information     Date Of Birth Sex Race Ethnicity Preferred Language    1985 Male White Non- English      Your appointments     2017 11:00 AM   Follow Up Visit with Timur De Leon M.D.   [facility] PHYSIAT[facility] (--)    [address] [id number] [phone]           You will be receiving a confirmation call a few days before your appointment from our automated call confirmation system.            Aug 16, 2017  3:30 PM   Follow Up Visit with Timur De Leon M.D.   [facility] PHYSIAT[facility] (--)    [address]  [place] [id number] [phone]           You will be receiving a confirmation call a few days before your appointment from our automated call confirmation system.              Problem List              ICD-10-CM Priority Class Noted - Resolved    Metatarsal fracture S92.309A   2014 - Present      Health Maintenance        Date Due Completion Dates    IMM " DTaP/Tdap/Td Vaccine (1 - Tdap) 7/23/2005 ---            Current Immunizations     No immunizations on file.      Below and/or attached are the medications your provider expects you to take. Review all of your home medications and newly ordered medications with your provider and/or pharmacist. Follow medication instructions as directed by your provider and/or pharmacist. Please keep your medication list with you and share with your provider. Update the information when medications are discontinued, doses are changed, or new medications (including over-the-counter products) are added; and carry medication information at all times in the event of emergency situations     Allergies:  HYDROCODONE - Itching               Medications  Valid as of: June 07, 2017 -  3:20 PM    Generic Name Brand Name Tablet Size Instructions for use    Baclofen (Tab) LIORESAL 10 MG Take 1 Tab by mouth 2 times a day.        Gabapentin (Tab) NEURONTIN 600 MG Take 1 Tab by mouth 3 times a day.        Gabapentin (Cap) NEURONTIN 300 MG Take 1 Cap by mouth 3 times a day. as needed for nerve pain        Lidocaine HCl (Solution) XYLOCAINE-MPF 1 %         Oxycodone-Acetaminophen (Tab) PERCOCET 5-325 MG Take 1/2  to 1 tablets by mouth twice per day as needed for pain.        Oxycodone-Acetaminophen (Tab) PERCOCET 5-325 MG Take 1/2  to 1 tablets by mouth twice per day as needed for pain.        .                 Medicines prescribed today were sent to:     [facility] [place] [address] [address] [address]    Phone: [phone] Fax: [phone]    Open 24 Hours?: No      Medication refill instructions:       If your prescription bottle indicates you have medication refills left, it is not necessary to call your provider’s office. Please contact your pharmacy and they will refill your medication.    If your prescription bottle indicates you do not have any refills left, you may  request refills at any time through one of the following ways: The online [employer] system (except Urgent Care), by calling your provider’s office, or by asking your pharmacy to contact your provider’s office with a refill request. Medication refills are processed only during regular business hours and may not be available until the next business day. Your provider may request additional information or to have a follow-up visit with you prior to refilling your medication.   *Please Note: Medication refills are assigned a new Rx number when refilled electronically. Your pharmacy may indicate that no refills were authorized even though a new prescription for the same medication is available at the pharmacy. Please request the medicine by name with the pharmacy before contacting your provider for a refill.           [employer] Access Code: [email or web address]  Expires: 7/7/2017  3:20 PM    [employer]  A secure, online tool to manage your health information     [employer]’s [employer]® is a secure, online tool that connects you to your personalized health information from the privacy of your home -- day or night - making it very easy for you to manage your healthcare. Once the activation process is completed, you can even access your medical information using the [employer] leon, which is available for free in the [employer] Leon store or Google Play store.     [employer] provides the following levels of access (as shown below):   My Chart Features   [facility]  Urgent  Care Non-[facility]  Primary Care  Doctor   Email your healthcare team securely and privately 24/7 X X X    Manage appointments: schedule your next appointment; view details of past/upcoming appointments X      Request prescription refills. X      View recent personal medical records, including lab and immunizations X X X X   View health record, including health history, allergies, medications X X X X   Read reports about your outpatient  visits, procedures, consult and ER notes X X X X   See your discharge summary, which is a recap of your hospital and/or ER visit that includes your diagnosis, lab results, and care plan. X X       How to register for [employer]:  1. Go to  [email or web address].  2. Click on the Sign Up Now box, which takes you to the New Member Sign Up page. You will need to provide the following information:  a. Enter your [employer] Access Code exactly as it appears at the top of this page. (You will not need to use this code after you’ve completed the sign-up process. If you do not sign up before the expiration date, you must request a new code.)   b. Enter your date of birth.   c. Enter your home email address.   d. Click Submit, and follow the next screen’s instructions.  3. Create a Mon[employer] Of Me ID. This will be your [employer] Me login ID and cannot be changed, so think of one that is secure and easy to remember.  4. Create a [employer] Me password. You can change your password at any time.  5. Enter your Password Reset Question and Answer. This can be used at a later time if you forget your password.   6. Enter your e-mail address. This allows you to receive e-mail notifications when new information is available in [employer] Of Me.  7. Click Sign Up. You can now view your health information.    For assistance activating your [employer] Of Me account, call [phone]        Quit Tobacco Information     Do you want to quit using tobacco?    Quitting tobacco decreases risks of cancer, heart and lung disease, increases life expectancy, improves sense of taste and smell, and increases spending money, among other benefits.    If you are thinking about quitting, we can help.  • [employer] Quit Tobacco Program: 716.923.5087  o Program occurs weekly for four weeks and includes pharmacist consultation on products to support quitting smoking or chewing tobacco. A provider referral is needed for pharmacist consultation.  • Tobacco Users Help Hotline: 9-800-QUIT-NOW  (293-6415) or https://[place].quitlogix.org/  o Free, confidential telephone and online coaching for [place] residents. Sessions are designed on a schedule that is convenient for you. Eligible clients receive free nicotine replacement therapy.  • Nationally: www.smokefree.gov  o Information and professional assistance to support both immediate and long-term needs as you become, and remain, a non-smoker. Smokefree.gov allows you to choose the help that best fits your needs.

## 2017-06-07 NOTE — PROGRESS NOTES
Subjective:      Aris Douglass presents with Follow-Up          Subjective: Mr. Peralta returns to the office today for follow-up evaluation of spinal/joints/musculoskeletal pain, also nerve pain. 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     Regarding today's visit:    The patient underwent bilateral lumbar 3, 4, and 5 medial branch blocks, facet blocks on 5/23/2017. The patient notes no pain relief during the local anesthetic phase, notes no steroid effect, denies complications, no overall change with his pain.    The patient notes ongoing pain in the lumbosacral region, constant, also lower limb neuropathic pain area. The patient notes the axial lumbosacral pain is most functionally limiting. The patient underwent bilateral lumbar 5 transforaminal epidural steroid injections on 4/11/2017 without benefit.    The patient notes intermittent hip pain, relatively controlled.    The patient notes intermittent mid back pain, without overt radicular component, relatively controlled.    The patient notes intermittent neck pain, controlled, without radicular component.    The patient notes decreased sensation below the knees, especially with polyneuropathy.    The patient notes intermittent paresthesias in the hands, primarily on the left.    The patient notes right ankle pain, primarily the lateral aspect. He has had prior ankle sprain in approximately November 2016, seen by orthopedics. The patient reports ankle instability symptoms. The patient had prior right foot fracture is 2014, healed. He has right ankle brace.    The patient notes some difficulty maintaining weight.    The patient has had prior treatment with medications. He has tried therapy/exercise. No bowel/bladder incontinence noted. He is making an effort with home exercise program as tolerated. The ongoing lumbosacral pain limits his ability to function.        MEDICAL RECORDS REVIEW/DATA REVIEW: Reviewed in epic.    I reviewed medications. Uses ibuprofen/NSAIDs intermittently. Tolerating gabapentin with benefit. Tolerating baclofen. Percocet tolerated, with benefit. The pain/symptomatic medications had been somewhat helpful for controlling the pain, mood appropriate for her condition, allows the patient to function, including ADLs. Side effects controlled. No aberrant behavior noted. Previously tried tramadol, not tolerated or effective. Previously tried Norco.    I reviewed  profile 6/7/2017.. Reviewed control subsequent agreement 4/2017.     I reviewed diagnostic studies:     I reviewed radiographs. Reviewed MRI lumbar spine 3/2017 showed disc protrusion at L5-S1, foraminal stenosis, degenerative disc disease. Reviewed MRI thoracic spine 3/2017 showed mild degenerative disc disease without stenosis. Reviewed thoracolumbar x-rays 3/2017 showed mild degenerative disc disease, mild scoliosis. Reviewed MRI right ankle 3/2017 showed mild swelling, otherwise unremarkable. Reviewed right ankle x-rays 3/2017 showed mild swelling, otherwise unremarkable. Reviewed MRI cervical spine 7/2016. Reviewed MRI brain 7/2016.    I reviewed lab studies. Reviewed CMP 5/2017, nonfasting, blood sugar of 129.. Reviewed urine drug screen 3/2017.    I reviewed medical issues. Followed by primary care provider, prior records reviewed, and consultants including neurology, cardiology.    I reviewed family history: Friedreich's ataxia    I reviewed social issues. Job seeking, prior construction type work.      PAST MEDICAL HISTORY:   Past Medical History   Diagnosis Date   • No known health problems    • Fracture 2000     right foot/ right ankle       PAST SURGICAL HISTORY:    Past Surgical History   Procedure Laterality Date   • Knee arthroscopy  2000     right knee   • Elbow orif  2001     left elbow   • Percutaneouospinning wrist  2001     left    • Mandible reduction closed  2001        ALLERGIES:  Hydrocodone    MEDICATIONS:    Outpatient Encounter Prescriptions as of 6/7/2017   Medication Sig Dispense Refill   • oxycodone-acetaminophen (PERCOCET) 5-325 MG Tab Take 1/2  to 1 tablets by mouth twice per day as needed for pain. 60 Tab 0   • gabapentin (NEURONTIN) 300 MG Cap Take 1 Cap by mouth 3 times a day. as needed for nerve pain 90 Cap 5   • baclofen (LIORESAL) 10 MG Tab Take 1 Tab by mouth 2 times a day. 60 Tab 11   • gabapentin (NEURONTIN) 600 MG tablet Take 1 Tab by mouth 3 times a day. 90 Tab 11   • lidocaine PF (XYLOCAINE-MPF) 1 % Solution      • oxycodone-acetaminophen (PERCOCET) 5-325 MG Tab Take 1/2  to 1 tablets by mouth twice per day as needed for pain. 60 Tab 0   • [DISCONTINUED] oxycodone-acetaminophen (PERCOCET) 5-325 MG Tab Take 1/2  to 1 tablets by mouth twice per day as needed for pain. 60 Tab 0     No facility-administered encounter medications on file as of 6/7/2017.       SOCIAL HISTORY:    Social History     Social History   • Marital Status: Single     Spouse Name: N/A   • Number of Children: N/A   • Years of Education: N/A     Social History Main Topics   • Smoking status: Current Every Day Smoker -- 0.25 packs/day for 10 years     Types: Cigarettes   • Smokeless tobacco: Never Used   • Alcohol Use: No   • Drug Use: No   • Sexual Activity: Not Asked     Other Topics Concern   • None     Social History Narrative     No substance use/abuse noted.    Other than perhaps anxiety/depression, adjustment disorder,psychiatric disorder noted    Review of Systems   Constitutional: Negative for fever and chills.   HENT: Negative for hearing loss and tinnitus.    Eyes: Negative for pain and discharge.   Respiratory: Negative for hemoptysis and sputum production.    Cardiovascular: Negative for orthopnea and claudication.   Gastrointestinal: Negative for vomiting and diarrhea.   Genitourinary: Negative for urgency and frequency.   Musculoskeletal: Positive for myalgias, back pain,  "joint pain and neck pain.   Skin: Negative.    Neurological: Positive for tingling and sensory change.   Endo/Heme/Allergies: Negative.    All other systems reviewed and are negative.        Objective:     /78 mmHg  Pulse 90  Temp(Src) 36.8 °C (98.2 °F)  Ht 1.93 m (6' 4\")  Wt 66.679 kg (147 lb)  BMI 17.90 kg/m2  SpO2 96%     Physical Exam  Constitutional: oriented to person, place, and time, appears well-developed and well-nourished.   HEENT: Normocephalic atraumatic, neck supple, no JVD noted, no masses noted, no meningeal signs noted  Lymphadenopathy: no cervical, supraclavicular, or inguinal lymphadenopathy noted  Cardiovascular: Intact distal pulses, including at wrists and ankles, no limb swelling noted  Pulmonary: No tachypnea noted, no accessory muscle use noted, no dyspnea noted  Abdominal: Soft, nontender, exhibits no distension, no peritoneal signs, no HSM  Musculoskeletal:   Right shoulder: exhibits only mild tenderness. Minimal pain with range of motion testing  Left shoulder: exhibits only mild tenderness. Minimal pain with range of motion testing  Right hip: exhibits only mild tenderness. Minimal pain with range of motion testing  Left hip: exhibits only mild tenderness. Minimal pain with range of motion testing  Cervical back: exhibits mild decreased range of motion, mild tenderness and mild pain. Spurling's testing produces mild axial pain, trigger points noted  Lumbar back: exhibits mild decreased range of motion, mild tenderness and mild pain. straight leg testing negative, trigger points noted, scoliosis noted, pain noted with quadrant loading and extension bilaterally, mild tenderness in sacroiliac joint region  Thoracic: Tender with palpation mid back region, pain with range of motion testing, scoliosis noted  Ankle: Tender with palpation about right ankle, prominent lateral malleolus, mild instability with stress testing, no signs of infection  Wrist/hand: mild pain with range of " motion testing, negative tinel's at wrist, negative tinel's at elbows  Neurological: oriented to person, place, and time. Cranial nerves grossly intact, normal strength for condition. Sensation intact distally. Reflexes 1+ in upper and lower limbs, Gait antalgic, wide-based, reciprocal, unable to perform heel walk/toe walk, unsteady with Romberg testing with eyes open  Skin: Skin is intact. no rashes or lesions noted  Psychiatric: normal mood and affect. speech is normal and behavior is normal. Judgment and thought content normal. Cognition and memory are normal.         Assessment/Plan:       ASSESSMENT:    1. Low back pain, myofascial pain, lumbar radiculitis, L5-S1 disc protrusion, foraminal stenosis, degenerative disc disease, lumbar spondylosis, scoliosis    2. Mid back pain, myofascial pain, mild degenerative disc disease without stenosis, scoliosis, relatively controlled    3. Right ankle pain, sprain strain, prominent lateral malleolus, instability symptoms, remote right foot fracture, mild swelling otherwise unremarkable right ankle x-ray/MRI 2/2017, orthopedics consulted.    - Reviewed orthotics/footwear    4. Neck pain, myofascial pain, controlled    5. Friedreich's ataxia, sensory-motor axonal polyneuropathy, lower limb nerve pain, neurology following    6. Controlled substance agreement signed    7. Comorbid medical issues, including weight loss, possible glucose intolerance, with care per primary care provider    - Consider A1c in subsequent encounter  - Reviewed nutrition related issues        DISCUSSION/PLAN:    - I discussed management options. I reviewed symptomatic care    - I reviewed home exercise program, with medical precautions. I reviewed activity modification.    - The patient can consider complementary trials with acupuncture, massage therapy, or TENS unit    - I reviewed medication monitoring. I reviewed pain medication dosing, reviewed risks and alternatives. For now, continue current  medications. I reviewed medication adjustment. I wrote a new prescription for the following:    - Added gabapentin 300 mg 3 times per day for titration with added gabapentin 600 mg currently taking  - The patient can try Lidoderm patch, or over-the-counter equivalent  - oxycodone-acetaminophen (PERCOCET) 5-325 MG Tab; Take 1/2  to 1 tablets by mouth twice per day as needed for pain.  Dispense: 60 Tab; Refill: 0, there are listed the pharmacy may fill the prescription is 7/24/2017    - I reviewed risks, side effects, and interactions of medications, including NSAIDs and over-the-counter medications. I reviewed tylenol/acetaminophen dosing.  I reviewed bowel management program. I advised the patient to avoid sudafed/pseudoephedrine type medication and herbs/supplements. I recommend avoid use of benzodiazepines due to risk of interaction with pain medication. I advise the patient to avoid alcohol use. I reviewed the controlled substance prescribing program. I reviewed further symptomatic medications.    - I reviewed additional diagnostic options, including further/advanced imaging, vascular studies, and further lab screen    - I reviewed additional therapeutic options, including further injection therapy and additional consultative input, including spine surgeon     - I reviewed psychosocial interventions    - I encourage the patient to stop or decrease cigarette use    - Return in 10 weeks or an as-needed basis.       Please note that this dictation was created using voice recognition software. I have made every reasonable attempt to correct obvious errors but there may be errors of grammar and content that I may have overlooked prior to finalization of this note.

## 2017-07-26 ENCOUNTER — OFFICE VISIT (OUTPATIENT)
Dept: PHYSICAL MEDICINE AND REHAB | Facility: MEDICAL CENTER | Age: 32
End: 2017-07-26
Payer: MEDICAID

## 2017-07-26 VITALS
SYSTOLIC BLOOD PRESSURE: 108 MMHG | DIASTOLIC BLOOD PRESSURE: 64 MMHG | BODY MASS INDEX: 18.39 KG/M2 | OXYGEN SATURATION: 98 % | HEART RATE: 87 BPM | HEIGHT: 76 IN | TEMPERATURE: 98.5 F | WEIGHT: 151 LBS

## 2017-07-26 DIAGNOSIS — M79.2 NERVE PAIN: ICD-10-CM

## 2017-07-26 DIAGNOSIS — M41.9 SCOLIOSIS OF THORACOLUMBAR SPINE, UNSPECIFIED SCOLIOSIS TYPE: ICD-10-CM

## 2017-07-26 DIAGNOSIS — M54.5 CHRONIC BILATERAL LOW BACK PAIN, WITH SCIATICA PRESENCE UNSPECIFIED: ICD-10-CM

## 2017-07-26 DIAGNOSIS — R26.9 IMPAIRED GAIT: ICD-10-CM

## 2017-07-26 DIAGNOSIS — G89.29 CHRONIC BILATERAL LOW BACK PAIN, WITH SCIATICA PRESENCE UNSPECIFIED: ICD-10-CM

## 2017-07-26 DIAGNOSIS — M79.18 MYOFASCIAL PAIN: ICD-10-CM

## 2017-07-26 DIAGNOSIS — M54.9 MID BACK PAIN: ICD-10-CM

## 2017-07-26 DIAGNOSIS — G11.11 FRIEDREICH ATAXIA (HCC): ICD-10-CM

## 2017-07-26 DIAGNOSIS — Z79.899 CONTROLLED SUBSTANCE AGREEMENT SIGNED: ICD-10-CM

## 2017-07-26 PROCEDURE — 99214 OFFICE O/P EST MOD 30 MIN: CPT | Performed by: PHYSICAL MEDICINE & REHABILITATION

## 2017-07-26 RX ORDER — GABAPENTIN 800 MG/1
800 TABLET ORAL 4 TIMES DAILY PRN
Qty: 120 TAB | Refills: 5 | Status: SHIPPED | OUTPATIENT
Start: 2017-07-26 | End: 2017-11-27

## 2017-07-26 RX ORDER — OXYCODONE HYDROCHLORIDE AND ACETAMINOPHEN 5; 325 MG/1; MG/1
TABLET ORAL
Qty: 60 TAB | Refills: 0 | Status: SHIPPED | OUTPATIENT
Start: 2017-07-26 | End: 2017-07-26 | Stop reason: SDUPTHER

## 2017-07-26 RX ORDER — METHYLPREDNISOLONE ACETATE 80 MG/ML
INJECTION, SUSPENSION INTRA-ARTICULAR; INTRALESIONAL; INTRAMUSCULAR; SOFT TISSUE
COMMUNITY
Start: 2017-05-23 | End: 2017-07-26

## 2017-07-26 RX ORDER — OXYCODONE HYDROCHLORIDE AND ACETAMINOPHEN 5; 325 MG/1; MG/1
TABLET ORAL
Qty: 60 TAB | Refills: 0 | Status: SHIPPED | OUTPATIENT
Start: 2017-07-26 | End: 2017-10-25 | Stop reason: SDUPTHER

## 2017-07-26 NOTE — PROGRESS NOTES
Subjective:      Aris Douglass presents with Follow-Up          Subjective: Mr. Peralta returns to the office today for follow-up evaluation of spinal/joints/musculoskeletal pain, also nerve pain. 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     The patient notes ongoing pain in the lumbosacral region, constant, also lower limb neuropathic pain area. The patient underwent bilateral lumbar 5 transforaminal epidural steroid injections on 4/11/2017 without benefit. The patient underwent bilateral lumbar 3, 4, and 5 medial branch blocks, facet blocks on 5/23/2017, noted no pain relief during the local anesthetic phase, noted no steroid effect, no overall change with his pain.    The patient notes intermittent hip pain, relatively controlled.    The patient notes intermittent mid back pain, without overt radicular component, relatively controlled.    The patient notes intermittent neck pain, controlled, without radicular component.    The patient notes decreased sensation below the knees, especially with polyneuropathy.    The patient notes intermittent paresthesias in the hands, primarily on the left.    The patient notes right ankle pain, primarily the lateral aspect. He has had prior ankle sprain in approximately November 2016, seen by orthopedics. The patient reports ankle instability symptoms. The patient had prior right foot fracture is 2014, healed. He has right ankle brace.    The patient has had prior treatment with medications. He has tried therapy/exercise. No bowel/bladder incontinence noted. He is making an effort with home exercise program as tolerated. The ongoing lumbosacral pain limits his ability to function.       MEDICAL RECORDS REVIEW/DATA REVIEW: Reviewed in epic.    I reviewed medications. Uses ibuprofen/NSAIDs intermittently. Tolerating gabapentin with benefit, pharmacy didn't fill additional gabapentin  for titration, as recommended. Tolerating baclofen. Percocet tolerated, with benefit. The pain/symptomatic medications had been somewhat helpful for controlling the pain, mood appropriate for her condition, allows the patient to function, including ADLs. Side effects controlled. No aberrant behavior noted. Previously tried tramadol, not tolerated or effective. Previously tried Norco.    I reviewed  profile 7/26/2017. Reviewed control subsequent agreement 4/2017.     I reviewed diagnostic studies:     I reviewed radiographs. Reviewed MRI lumbar spine 3/2017 showed disc protrusion at L5-S1, foraminal stenosis, degenerative disc disease. Reviewed MRI thoracic spine 3/2017 showed mild degenerative disc disease without stenosis. Reviewed thoracolumbar x-rays 3/2017 showed mild degenerative disc disease, mild scoliosis. Reviewed MRI right ankle 3/2017 showed mild swelling, otherwise unremarkable. Reviewed right ankle x-rays 3/2017 showed mild swelling, otherwise unremarkable. Reviewed MRI cervical spine 7/2016. Reviewed MRI brain 7/2016.    I reviewed lab studies. Reviewed CMP 5/2017, nonfasting, blood sugar of 129.. Reviewed urine drug screen 3/2017.    I reviewed medical issues. Followed by primary care provider, prior records reviewed, and consultants including neurology, cardiology.    I reviewed family history: Friedreich's ataxia    I reviewed social issues. Job seeking, prior construction type work.      PAST MEDICAL HISTORY:   Past Medical History   Diagnosis Date   • No known health problems    • Fracture 2000     right foot/ right ankle       PAST SURGICAL HISTORY:    Past Surgical History   Procedure Laterality Date   • Knee arthroscopy  2000     right knee   • Elbow orif  2001     left elbow   • Percutaneouospinning wrist  2001     left    • Mandible reduction closed  2001       ALLERGIES:  Hydrocodone    MEDICATIONS:    Outpatient Encounter Prescriptions as of 7/26/2017   Medication Sig Dispense Refill   •  gabapentin (NEURONTIN) 800 MG tablet Take 1 Tab by mouth 4 times a day as needed. for nerve pain. 120 Tab 5   • oxycodone-acetaminophen (PERCOCET) 5-325 MG Tab Take 1/2  to 1 tablets by mouth twice per day as needed for pain. 60 Tab 0   • baclofen (LIORESAL) 10 MG Tab Take 1 Tab by mouth 2 times a day. 60 Tab 11   • [DISCONTINUED] DEPO-MEDROL 80 MG/ML Suspension      • [DISCONTINUED] oxycodone-acetaminophen (PERCOCET) 5-325 MG Tab Take 1/2  to 1 tablets by mouth twice per day as needed for pain. 60 Tab 0   • lidocaine PF (XYLOCAINE-MPF) 1 % Solution      • [DISCONTINUED] oxycodone-acetaminophen (PERCOCET) 5-325 MG Tab Take 1/2  to 1 tablets by mouth twice per day as needed for pain. 60 Tab 0   • [DISCONTINUED] gabapentin (NEURONTIN) 300 MG Cap Take 1 Cap by mouth 3 times a day. as needed for nerve pain 90 Cap 5   • [DISCONTINUED] oxycodone-acetaminophen (PERCOCET) 5-325 MG Tab Take 1/2  to 1 tablets by mouth twice per day as needed for pain. 60 Tab 0   • [DISCONTINUED] gabapentin (NEURONTIN) 600 MG tablet Take 1 Tab by mouth 3 times a day. 90 Tab 11     No facility-administered encounter medications on file as of 7/26/2017.       SOCIAL HISTORY:    Social History     Social History   • Marital Status: Single     Spouse Name: N/A   • Number of Children: N/A   • Years of Education: N/A     Social History Main Topics   • Smoking status: Current Every Day Smoker -- 0.25 packs/day for 10 years     Types: Cigarettes   • Smokeless tobacco: Never Used   • Alcohol Use: No   • Drug Use: No   • Sexual Activity: Not Asked     Other Topics Concern   • None     Social History Narrative     No substance use/abuse noted.    Other than perhaps anxiety/depression, adjustment disorder,psychiatric disorder noted    Review of Systems   Constitutional: Negative for fever and chills.   HENT: Negative for hearing loss and tinnitus.    Eyes: Negative for pain and discharge.   Respiratory: Negative for hemoptysis and sputum production.   "  Cardiovascular: Negative for orthopnea and claudication.   Gastrointestinal: Negative for vomiting and diarrhea.   Genitourinary: Negative for urgency and frequency.   Musculoskeletal: Positive for myalgias, back pain, joint pain and neck pain.   Skin: Negative.    Neurological: Positive for tingling and sensory change.   Endo/Heme/Allergies: Negative.    All other systems reviewed and are negative.        Objective:     /64 mmHg  Pulse 87  Temp(Src) 36.9 °C (98.5 °F)  Ht 1.93 m (6' 4\")  Wt 68.493 kg (151 lb)  BMI 18.39 kg/m2  SpO2 98%     Physical Exam  Constitutional: oriented to person, place, and time, appears well-developed and well-nourished.   HEENT: Normocephalic atraumatic, neck supple, no JVD noted, no masses noted, no meningeal signs noted  Lymphadenopathy: no cervical, supraclavicular, or inguinal lymphadenopathy noted  Cardiovascular: Intact distal pulses, including at wrists and ankles, no limb swelling noted  Pulmonary: No tachypnea noted, no accessory muscle use noted, no dyspnea noted  Abdominal: Soft, nontender, exhibits no distension, no peritoneal signs, no HSM  Musculoskeletal:   Right shoulder: exhibits only mild tenderness. Minimal pain with range of motion testing  Left shoulder: exhibits only mild tenderness. Minimal pain with range of motion testing  Right hip: exhibits only mild tenderness. Minimal pain with range of motion testing  Left hip: exhibits only mild tenderness. Minimal pain with range of motion testing  Cervical back: exhibits mild decreased range of motion, mild tenderness and mild pain. Spurling's testing produces mild axial pain, trigger points noted  Lumbar back: exhibits mild decreased range of motion, mild tenderness and mild pain. straight leg testing negative, trigger points noted, scoliosis noted, pain noted with quadrant loading and extension bilaterally, mild tenderness in sacroiliac joint region  Thoracic: Tender with palpation mid back region, pain " with range of motion testing, scoliosis noted  Ankle: Tender with palpation about right ankle, prominent lateral malleolus, mild instability with stress testing, no signs of infection  Wrist/hand: mild pain with range of motion testing, negative tinel's at wrist, negative tinel's at elbows  Neurological: oriented to person, place, and time. Cranial nerves grossly intact, normal strength for condition. Sensation intact distally. Reflexes 1+ in upper and lower limbs, Gait antalgic, wide-based, reciprocal, unable to perform heel walk/toe walk, unsteady with Romberg testing with eyes open  Skin: Skin is intact. no rashes or lesions noted  Psychiatric: normal mood and affect. speech is normal and behavior is normal. Judgment and thought content normal. Cognition and memory are normal.         Assessment/Plan:       ASSESSMENT:    1. Low back pain, myofascial pain, lumbar radiculitis, L5-S1 disc protrusion, foraminal stenosis, degenerative disc disease, lumbar spondylosis, scoliosis    2. Mid back pain, myofascial pain, mild degenerative disc disease without stenosis, scoliosis, relatively controlled    3. Right ankle pain, sprain strain, prominent lateral malleolus, instability symptoms, remote right foot fracture, mild swelling otherwise unremarkable right ankle x-ray/MRI 2/2017, orthopedics consulted.    - Reviewed orthotics/footwear    4. Neck pain, myofascial pain, controlled    5. Friedreich's ataxia, sensory-motor axonal polyneuropathy, lower limb nerve pain, neurology following    6. Controlled substance agreement signed    7. Comorbid medical issues, including weight loss, possible glucose intolerance, with care per primary care provider    - Consider A1C check  - Reviewed nutrition related issues        DISCUSSION/PLAN:    - I discussed management options. I reviewed symptomatic care    - I reviewed home exercise program, with medical precautions. I reviewed activity modification.    - The patient can consider  complementary trials with acupuncture, massage therapy, or TENS unit    - I reviewed medication monitoring. I reviewed pain medication dosing, reviewed risks and alternatives. For now, continue current medications. I reviewed medication adjustment, including opioid taper, patient to consider. I wrote a new prescription for the following:    - Updated prescription for gabapentin for titration to 800 mg 4 times per day as needed for nerve pain, replaces gabapentin 600 mg and 300 mg prescriptions.  - oxycodone-acetaminophen (PERCOCET) 5-325 MG Tab; Take 1/2  to 1 tablets by mouth twice per day as needed for pain.  Dispense: 60 Tab; Refill: 0, the earliest date the pharmacy may fill the prescription is 8/22/2017, 2 prescriptions written for 2 month supply    - I reviewed risks, side effects, and interactions of medications, including over-the-counter medications. I reviewed tylenol/acetaminophen dosing.  I reviewed bowel management program. I advised the patient to avoid sudafed/pseudoephedrine type medication and herbs/supplements. I recommend avoid use of benzodiazepines due to risk of interaction with pain medication. I advise the patient to avoid alcohol use. I reviewed the controlled substance prescribing program. I reviewed further symptomatic medications.    - I reviewed additional diagnostic options, including further/advanced imaging, vascular studies, and further lab screen    - I reviewed additional therapeutic options, including injection/interventional therapy and additional consultative input    - I reviewed psychosocial interventions    - I encourage the patient to stop or decrease cigarette use    - Return in 3 months or an as-needed basis.       Please note that this dictation was created using voice recognition software. I have made every reasonable attempt to correct obvious errors but there may be errors of grammar and content that I may have overlooked prior to finalization of this note.

## 2017-07-26 NOTE — MR AVS SNAPSHOT
"        Aris Douglass   2017 11:00 AM   Office Visit   MRN: [id number]    Department:  [facility]   Dept Phone:  [phone]    Description:  Male : 1985   Provider:  Timur De Leon M.D.           Reason for Visit     Follow-Up           Allergies as of 2017     Allergen Noted Reactions    Hydrocodone 2014   Itching      You were diagnosed with     Nerve pain   [715031]       Friedreich ataxia (CMS-HCC)   [196502]       Impaired gait   [962016]       Mid back pain   [980260]       Chronic bilateral low back pain, with sciatica presence unspecified   [7687046]       Scoliosis of thoracolumbar spine, unspecified scoliosis type   [8742812]       Controlled substance agreement signed   [[id number]]         Vital Signs     Blood Pressure Pulse Temperature Height Weight Body Mass Index    108/64 mmHg 87 36.9 °C (98.5 °F) 1.93 m (6' 4\") 68.493 kg (151 lb) 18.39 kg/m2    Oxygen Saturation Smoking Status                98% Current Every Day Smoker          Basic Information     Date Of Birth Sex Race Ethnicity Preferred Language    1985 Male White Non- English      Your appointments     Aug 16, 2017  3:30 PM   Follow Up Visit with Timur De Leon M.D.   [facility] (--)    [address] [facility] [address]  [facility] [id number] [phone]           You will be receiving a confirmation call a few days before your appointment from our automated call confirmation system.            Oct 25, 2017 11:00 AM   [facility]    [address] [facility] [address]  [place] [id number] [phone]           You will be receiving a confirmation call a few days before your appointment from our automated call confirmation system.              Problem List              ICD-10-CM Priority Class Noted - Resolved    Metatarsal fracture S92.309A   2014 - Present      Health Maintenance        Date Due Completion Dates   " IMM DTaP/Tdap/Td Vaccine (1 - Tdap) 7/23/2005 ---    IMM INFLUENZA (1) 9/1/2017 ---            Current Immunizations     No immunizations on file.      Below and/or attached are the medications your provider expects you to take. Review all of your home medications and newly ordered medications with your provider and/or pharmacist. Follow medication instructions as directed by your provider and/or pharmacist. Please keep your medication list with you and share with your provider. Update the information when medications are discontinued, doses are changed, or new medications (including over-the-counter products) are added; and carry medication information at all times in the event of emergency situations     Allergies:  HYDROCODONE - Itching               Medications  Valid as of: July 26, 2017 - 11:50 AM    Generic Name Brand Name Tablet Size Instructions for use    Baclofen (Tab) LIORESAL 10 MG Take 1 Tab by mouth 2 times a day.        Gabapentin (Tab) NEURONTIN 800 MG Take 1 Tab by mouth 4 times a day as needed. for nerve pain.        Lidocaine HCl (Solution) XYLOCAINE-MPF 1 %         Oxycodone-Acetaminophen (Tab) PERCOCET 5-325 MG Take 1/2  to 1 tablets by mouth twice per day as needed for pain.        .                 Medicines prescribed today were sent to:     [id number] [address]    Phone: [phone] Fax: [phone]    Open 24 Hours?: No      Medication refill instructions:       If your prescription bottle indicates you have medication refills left, it is not necessary to call your provider’s office. Please contact your pharmacy and they will refill your medication.    If your prescription bottle indicates you do not have any refills left, you may request refills at any time through one of the following ways: The online [employer] system (except Urgent Care), by calling your provider’s office, or by asking your  pharmacy to contact your provider’s office with a refill request. Medication refills are processed only during regular business hours and may not be available until the next business day. Your provider may request additional information or to have a follow-up visit with you prior to refilling your medication.   *Please Note: Medication refills are assigned a new Rx number when refilled electronically. Your pharmacy may indicate that no refills were authorized even though a new prescription for the same medication is available at the pharmacy. Please request the medicine by name with the pharmacy before contacting your provider for a refill.           [employer] Access Code: [email or web address]  Expires: 8/25/2017 11:50 AM    [employer]  A secure, online tool to manage your health information     [employer]® is a secure, online tool that connects you to your personalized health information from the privacy of your home -- day or night - making it very easy for you to manage your healthcare. Once the activation process is completed, you can even access your medical information using the [employer] leon, which is available for free in the [employer] store or Google Play store.     [employer] provides the following levels of access (as shown below):   My Chart Features   [facility] Doctor [facility]  Specialists [facility]  Urgent  Care Non-[facility]  Primary Care  Doctor   Email your healthcare team securely and privately 24/7 X X X    Manage appointments: schedule your next appointment; view details of past/upcoming appointments X      Request prescription refills. X      View recent personal medical records, including lab and immunizations X X X X   View health record, including health history, allergies, medications X X X X   Read reports about your outpatient visits, procedures, consult and ER notes X X X X   See your discharge summary, which is a recap of your hospital and/or ER visit that includes your diagnosis, lab  results, and care plan. X X       How to register for [employer]:  1. Go to  [email or web address].  2. Click on the Sign Up Now box, which takes you to the New Member Sign Up page. You will need to provide the following information:  a. Enter your [employer] Access Code exactly as it appears at the top of this page. (You will not need to use this code after you’ve completed the sign-up process. If you do not sign up before the expiration date, you must request a new code.)   b. Enter your date of birth.   c. Enter your home email address.   d. Click Submit, and follow the next screen’s instructions.  3. Create a [employer] ID. This will be your [employer] login ID and cannot be changed, so think of one that is secure and easy to remember.  4. Create a [employer] password. You can change your password at any time.  5. Enter your Password Reset Question and Answer. This can be used at a later time if you forget your password.   6. Enter your e-mail address. This allows you to receive e-mail notifications when new information is available in [employer].  7. Click Sign Up. You can now view your health information.    For assistance activating your [employer] account, call [phone]        Quit Tobacco Information     Do you want to quit using tobacco?    Quitting tobacco decreases risks of cancer, heart and lung disease, increases life expectancy, improves sense of taste and smell, and increases spending money, among other benefits.    If you are thinking about quitting, we can help.  • [facility]: [phone]  o Program occurs weekly for four weeks and includes pharmacist consultation on products to support quitting smoking or chewing tobacco. A provider referral is needed for pharmacist consultation.  • Tobacco Users Help Hotline: 4-541-QUIT-NOW (203-2865) or https://[place].quitlogix.org/  o Free, confidential telephone and online coaching for [place] residents. Sessions are designed on a schedule that is  convenient for you. Eligible clients receive free nicotine replacement therapy.  • Nationally: www.smokefree.gov  o Information and professional assistance to support both immediate and long-term needs as you become, and remain, a non-smoker. Smokefree.gov allows you to choose the help that best fits your needs.

## 2017-10-25 ENCOUNTER — OFFICE VISIT (OUTPATIENT)
Dept: PHYSICAL MEDICINE AND REHAB | Facility: MEDICAL CENTER | Age: 32
End: 2017-10-25
Payer: MEDICAID

## 2017-10-25 VITALS
WEIGHT: 150 LBS | DIASTOLIC BLOOD PRESSURE: 80 MMHG | SYSTOLIC BLOOD PRESSURE: 120 MMHG | TEMPERATURE: 97.2 F | BODY MASS INDEX: 18.27 KG/M2 | HEART RATE: 72 BPM | OXYGEN SATURATION: 100 % | HEIGHT: 76 IN

## 2017-10-25 DIAGNOSIS — G89.29 CHRONIC BILATERAL LOW BACK PAIN, WITH SCIATICA PRESENCE UNSPECIFIED: ICD-10-CM

## 2017-10-25 DIAGNOSIS — Z79.899 CONTROLLED SUBSTANCE AGREEMENT SIGNED: ICD-10-CM

## 2017-10-25 DIAGNOSIS — R26.9 IMPAIRED GAIT: ICD-10-CM

## 2017-10-25 DIAGNOSIS — M41.9 SCOLIOSIS OF THORACOLUMBAR SPINE, UNSPECIFIED SCOLIOSIS TYPE: ICD-10-CM

## 2017-10-25 DIAGNOSIS — M79.18 MYOFASCIAL PAIN: ICD-10-CM

## 2017-10-25 DIAGNOSIS — M54.5 CHRONIC BILATERAL LOW BACK PAIN, WITH SCIATICA PRESENCE UNSPECIFIED: ICD-10-CM

## 2017-10-25 DIAGNOSIS — G11.11 FRIEDREICH ATAXIA (HCC): ICD-10-CM

## 2017-10-25 DIAGNOSIS — M54.9 MID BACK PAIN: ICD-10-CM

## 2017-10-25 PROCEDURE — 99214 OFFICE O/P EST MOD 30 MIN: CPT | Performed by: PHYSICAL MEDICINE & REHABILITATION

## 2017-10-25 RX ORDER — OXYCODONE HYDROCHLORIDE AND ACETAMINOPHEN 5; 325 MG/1; MG/1
TABLET ORAL
Qty: 60 TAB | Refills: 0 | Status: SHIPPED | OUTPATIENT
Start: 2017-10-25 | End: 2017-10-25 | Stop reason: SDUPTHER

## 2017-10-25 RX ORDER — OXYCODONE HYDROCHLORIDE AND ACETAMINOPHEN 5; 325 MG/1; MG/1
TABLET ORAL
Qty: 60 TAB | Refills: 0 | Status: SHIPPED | OUTPATIENT
Start: 2017-10-25 | End: 2017-11-27

## 2017-10-25 NOTE — PROGRESS NOTES
Subjective:      Aris Douglass presents with Follow-Up        Subjective: Mr. Douglass returns to the office today for follow-up evaluation of spinal/joints/musculoskeletal pain, also nerve pain. 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     The patient notes pain in the lumbosacral region, constant, also lower limb neuropathic pain area. The patient notes pain flares, primarily activity associated. The patient underwent bilateral lumbar 5 transforaminal epidural steroid injections on 4/11/2017 without benefit. The patient underwent bilateral lumbar 3, 4, and 5 medial branch blocks, facet blocks on 5/23/2017, noted no pain relief during the local anesthetic phase, noted no steroid effect, no overall change with his pain.    The patient notes intermittent hip pain, relatively controlled.    The patient notes intermittent mid back pain, without overt radicular component, relatively controlled.    The patient notes intermittent neck pain, controlled, without radicular component.    The patient notes decreased sensation below the knees, associated with polyneuropathy.    The patient notes intermittent paresthesias in the hands.    The patient notes intermittent right ankle pain, primarily the lateral aspect. He has had prior ankle sprain in approximately November 2016, seen by orthopedics. The patient reports ankle instability symptoms. The patient had prior right foot fracture is 2014, healed. He has right ankle brace.    The patient has had prior treatment with medications. He has tried therapy/exercise. No bowel/bladder incontinence noted. He is making an effort with home exercise program as tolerated. The ongoing lumbosacral pain limits his ability to function.       MEDICAL RECORDS REVIEW/DATA REVIEW: Reviewed in epic.    I reviewed medications. Uses ibuprofen/NSAIDs intermittently. The pain/symptomatic medications had  been somewhat helpful for controlling the pain, mood appropriate for her condition, allows the patient to function, including ADLs. Side effects controlled. No aberrant behavior noted. Previously tried tramadol, not tolerated or effective. Previously tried Norco.    I reviewed  profile 10/25/2017, consistent. Reviewed control subsequent agreement 4/2017.     I reviewed diagnostic studies:     I reviewed radiographs. Reviewed MRI lumbar spine 3/2017 showed disc protrusion at L5-S1, foraminal stenosis, degenerative disc disease. Reviewed MRI thoracic spine 3/2017 showed mild degenerative disc disease without stenosis. Reviewed thoracolumbar x-rays 3/2017 showed mild degenerative disc disease, mild scoliosis. Reviewed MRI right ankle 3/2017 showed mild swelling, otherwise unremarkable. Reviewed right ankle x-rays 3/2017 showed mild swelling, otherwise unremarkable. Reviewed MRI cervical spine 7/2016. Reviewed MRI brain 7/2016.    Reviewed results from electrodiagnostic testing 8/2016, per neurology, findings are suggestive of moderate widespread mostly sensory and to lesser degree motor peripheral polyneuropathy. There are no elements to suggest a demyelinating etiology.     I reviewed lab studies. Reviewed CMP 5/2017, nonfasting, blood sugar of 129.. Reviewed urine drug screen 3/2017.    I reviewed medical issues. Followed by primary care provider, prior records reviewed, and consultants including neurology, cardiology.    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      PAST MEDICAL HISTORY:   Past Medical History:   Diagnosis Date   • Fracture 2000    right foot/ right ankle   • No known health problems        PAST SURGICAL HISTORY:    Past Surgical History:   Procedure Laterality Date   • ELBOW ORIF  2001    left elbow   • PERCUTANEOUOSPINNING WRIST  2001    left    • MANDIBLE REDUCTION CLOSED  2001   • KNEE ARTHROSCOPY  2000    right knee        ALLERGIES:  Hydrocodone    MEDICATIONS:    Outpatient Encounter Prescriptions as of 10/25/2017   Medication Sig Dispense Refill   • oxycodone-acetaminophen (PERCOCET) 5-325 MG Tab Take 1/2  to 1 tablets by mouth twice per day as needed for severe pain. 60 Tab 0   • gabapentin (NEURONTIN) 800 MG tablet Take 1 Tab by mouth 4 times a day as needed. for nerve pain. 120 Tab 5   • baclofen (LIORESAL) 10 MG Tab Take 1 Tab by mouth 2 times a day. 60 Tab 11   • [DISCONTINUED] oxycodone-acetaminophen (PERCOCET) 5-325 MG Tab Take 1/2  to 1 tablets by mouth twice per day as needed for severe pain. 60 Tab 0   • [DISCONTINUED] oxycodone-acetaminophen (PERCOCET) 5-325 MG Tab Take 1/2  to 1 tablets by mouth twice per day as needed for pain. 60 Tab 0   • lidocaine PF (XYLOCAINE-MPF) 1 % Solution        No facility-administered encounter medications on file as of 10/25/2017.        SOCIAL HISTORY:    Social History     Social History   • Marital status: Single     Spouse name: N/A   • Number of children: N/A   • Years of education: N/A     Social History Main Topics   • Smoking status: Current Every Day Smoker     Packs/day: 0.25     Years: 10.00     Types: Cigarettes   • Smokeless tobacco: Never Used   • Alcohol use No   • Drug use: No   • Sexual activity: Not on file     Other Topics Concern   •  Service No   • Blood Transfusions No   • Caffeine Concern No   • Occupational Exposure No   • Hobby Hazards No   • Sleep Concern Yes   • Stress Concern No   • Weight Concern No   • Special Diet No   • Back Care Yes   • Exercise Yes   • Bike Helmet No   • Seat Belt Yes   • Self-Exams No     Social History Narrative   • No narrative on file     No substance use/abuse noted.    Other than perhaps anxiety/depression, adjustment disorder,psychiatric disorder noted    Review of Systems   Constitutional: Negative for fever and chills.   HENT: Negative for hearing loss and tinnitus.    Eyes: Negative for pain and discharge.  "  Respiratory: Negative for hemoptysis and sputum production.    Cardiovascular: Negative for orthopnea and claudication.   Gastrointestinal: Negative for vomiting and diarrhea.   Genitourinary: Negative for urgency and frequency.   Musculoskeletal: Positive for myalgias, back pain, joint pain and neck pain.   Skin: Negative.    Neurological: Positive for tingling and sensory change.   Endo/Heme/Allergies: Negative.    All other systems reviewed and are negative.        Objective:     /80   Pulse 72   Temp 36.2 °C (97.2 °F)   Ht 1.93 m (6' 4\")   Wt 68 kg (150 lb)   SpO2 100%   BMI 18.26 kg/m²      Physical Exam  Constitutional: oriented to person, place, and time, appears well-developed and well-nourished.   HEENT: Normocephalic atraumatic, neck supple, no JVD noted, no masses noted, no meningeal signs noted  Lymphadenopathy: no cervical, supraclavicular, or inguinal lymphadenopathy noted  Cardiovascular: Intact distal pulses, including at wrists and ankles, no limb swelling noted  Pulmonary: No tachypnea noted, no accessory muscle use noted, no dyspnea noted  Abdominal: Soft, nontender, exhibits no distension, no peritoneal signs, no HSM  Musculoskeletal:   Right shoulder: exhibits only mild tenderness. Minimal pain with range of motion testing  Left shoulder: exhibits only mild tenderness. Minimal pain with range of motion testing  Right hip: exhibits only mild tenderness. Minimal pain with range of motion testing  Left hip: exhibits only mild tenderness. Minimal pain with range of motion testing  Cervical back: exhibits mild decreased range of motion, mild tenderness and mild pain. Spurling's testing produces mild axial pain, trigger points noted  Lumbar back: exhibits mild decreased range of motion, mild tenderness and mild pain. straight leg testing negative, trigger points noted, scoliosis noted, pain noted with quadrant loading and extension bilaterally, mild tenderness in sacroiliac joint " region  Thoracic: Tender with palpation mid back region, pain with range of motion testing, scoliosis noted  Ankle: Tender with palpation about right ankle, prominent lateral malleolus, mild instability with stress testing, no signs of infection  Wrist/hand: mild pain with range of motion testing, negative tinel's at wrist, negative tinel's at elbows  Neurological: oriented to person, place, and time. Cranial nerves grossly intact, normal strength for condition. Sensation intact distally. Reflexes 1+ in upper and lower limbs, Gait antalgic, wide-based, reciprocal, unable to perform heel walk/toe walk, unsteady with Romberg testing with eyes open  Skin: Skin is intact. no rashes or lesions noted  Psychiatric: normal mood and affect. speech is normal and behavior is normal. Judgment and thought content normal. Cognition and memory are normal.         Assessment/Plan:       ASSESSMENT:    1. Low back pain, myofascial pain, lumbar radiculitis, L5-S1 disc protrusion, foraminal stenosis, degenerative disc disease, lumbar spondylosis, scoliosis    2. Mid back pain, myofascial pain, mild degenerative disc disease without stenosis, scoliosis, relatively controlled    3. Right ankle pain, sprain strain, prominent lateral malleolus, instability symptoms, remote right foot fracture, mild swelling otherwise unremarkable right ankle x-ray/MRI 2/2017, orthopedics consulted.    - Reviewed orthotics/footwear    4. Neck pain, myofascial pain, controlled    5. Friedreich's ataxia, sensory-motor axonal polyneuropathy, lower limb nerve pain, neurology following    6. Controlled substance agreement signed    - Check results on urine drug screen, provided today  - Submitted pain medicine consultation for transfer of care. Note, patient is compliant with provisions of facility controlled substance prescribing program    7. Comorbid medical issues, including weight loss, possible glucose intolerance, with care per primary care provider    -  Consider A1C check        DISCUSSION/PLAN:    - I discussed management options. I reviewed symptomatic care    - I reviewed home exercise program, with medical precautions. I reviewed activity modification.    - The patient can consider complementary trials with acupuncture, massage therapy, or TENS unit    - I reviewed medication monitoring. I reviewed pain medication dosing, reviewed risks and alternatives. For now, continue current medications. I reviewed medication adjustment, including opioid taper, patient to consider.     - To facilitate transfer of care, I wrote new prescriptions for the following:    - oxycodone-acetaminophen (PERCOCET) 5-325 MG Tab; Take 1/2  to 1 tablets by mouth twice per day as needed for severe pain.  Dispense: 60 Tab; Refill: 0, 2 prescriptions written for 2 month supply    - I reviewed risks, side effects, and interactions of medications, including over-the-counter medications. I reviewed tylenol/acetaminophen dosing.  I reviewed bowel management program. I advised the patient to avoid sudafed/pseudoephedrine type medication and herbs/supplements. I recommend avoid use of benzodiazepines due to risk of interaction with pain medication. I advise the patient to avoid alcohol use. I reviewed the controlled substance prescribing program. I reviewed further symptomatic medications.    - I reviewed additional diagnostic options, including further/advanced imaging, vascular studies, and further lab screen    - I reviewed additional therapeutic options, including injection/interventional therapy and additional consultative input    - I reviewed psychosocial interventions    - I encourage the patient to stop or decrease cigarette use    - Return on an as-needed basis. I reviewed transfer of care issues.       Please note that this dictation was created using voice recognition software. I have made every reasonable attempt to correct obvious errors but there may be errors of grammar and  content that I may have overlooked prior to finalization of this note.

## 2017-11-27 ENCOUNTER — HOSPITAL ENCOUNTER (EMERGENCY)
Facility: MEDICAL CENTER | Age: 32
End: 2017-11-27
Attending: EMERGENCY MEDICINE
Payer: MEDICAID

## 2017-11-27 ENCOUNTER — APPOINTMENT (OUTPATIENT)
Dept: RADIOLOGY | Facility: MEDICAL CENTER | Age: 32
End: 2017-11-27
Attending: EMERGENCY MEDICINE
Payer: MEDICAID

## 2017-11-27 VITALS
RESPIRATION RATE: 16 BRPM | HEART RATE: 87 BPM | SYSTOLIC BLOOD PRESSURE: 145 MMHG | TEMPERATURE: 99.5 F | DIASTOLIC BLOOD PRESSURE: 95 MMHG | WEIGHT: 151.9 LBS | OXYGEN SATURATION: 98 % | BODY MASS INDEX: 18.5 KG/M2 | HEIGHT: 76 IN

## 2017-11-27 DIAGNOSIS — M25.511 ACUTE PAIN OF RIGHT SHOULDER: ICD-10-CM

## 2017-11-27 PROCEDURE — A9270 NON-COVERED ITEM OR SERVICE: HCPCS | Performed by: EMERGENCY MEDICINE

## 2017-11-27 PROCEDURE — 700102 HCHG RX REV CODE 250 W/ 637 OVERRIDE(OP): Performed by: EMERGENCY MEDICINE

## 2017-11-27 PROCEDURE — 99282 EMERGENCY DEPT VISIT SF MDM: CPT

## 2017-11-27 RX ORDER — ACETAMINOPHEN 325 MG/1
650 TABLET ORAL ONCE
Status: COMPLETED | OUTPATIENT
Start: 2017-11-27 | End: 2017-11-27

## 2017-11-27 RX ORDER — BACLOFEN 10 MG/1
10 TABLET ORAL EVERY EVENING
Status: SHIPPED | COMMUNITY
End: 2018-03-20

## 2017-11-27 RX ORDER — IBUPROFEN 600 MG/1
600 TABLET ORAL ONCE
Status: COMPLETED | OUTPATIENT
Start: 2017-11-27 | End: 2017-11-27

## 2017-11-27 RX ORDER — AMOXICILLIN AND CLAVULANATE POTASSIUM 875; 125 MG/1; MG/1
1 TABLET, FILM COATED ORAL 2 TIMES DAILY
Status: SHIPPED | COMMUNITY
Start: 2017-11-03 | End: 2017-11-29

## 2017-11-27 RX ORDER — GABAPENTIN 800 MG/1
800 TABLET ORAL PRN
Status: SHIPPED | COMMUNITY
End: 2019-04-24

## 2017-11-27 RX ADMIN — ACETAMINOPHEN 650 MG: 325 TABLET, FILM COATED ORAL at 12:08

## 2017-11-27 RX ADMIN — IBUPROFEN 600 MG: 600 TABLET, FILM COATED ORAL at 12:09

## 2017-11-27 ASSESSMENT — PAIN SCALES - GENERAL: PAINLEVEL_OUTOF10: 10

## 2017-11-27 NOTE — DISCHARGE INSTRUCTIONS
Please follow-up with your primary care provider for blood pressure management.        Shoulder Pain  The shoulder is the joint that connects your arm to your body. Muscles and band-like tissues that connect bones to muscles (tendons) hold the joint together. Shoulder pain is felt if an injury or medical problem affects one or more parts of the shoulder.  HOME CARE   · Put ice on the sore area.  ¨ Put ice in a plastic bag.  ¨ Place a towel between your skin and the bag.  ¨ Leave the ice on for 15-20 minutes, 03-04 times a day for the first 2 days.  · Stop using cold packs if they do not help with the pain.  · If you were given something to keep your shoulder from moving (sling; shoulder immobilizer), wear it as told. Only take it off to shower or bathe.  · Move your arm as little as possible, but keep your hand moving to prevent puffiness (swelling).  · Squeeze a soft ball or foam pad as much as possible to help prevent swelling.  · Take medicine as told by your doctor.  GET HELP IF:  · You have progressing new pain in your arm, hand, or fingers.  · Your hand or fingers get cold.  · Your medicine does not help lessen your pain.  GET HELP RIGHT AWAY IF:   · Your arm, hand, or fingers are numb or tingling.  · Your arm, hand, or fingers are puffy (swollen), painful, or turn white or blue.  MAKE SURE YOU:   · Understand these instructions.  · Will watch your condition.  · Will get help right away if you are not doing well or get worse.     This information is not intended to replace advice given to you by your health care provider. Make sure you discuss any questions you have with your health care provider.     Document Released: 06/05/2009 Document Revised: 01/08/2016 Document Reviewed: 04/11/2016  [employer] Interactive Patient Education ©2016 [employer]

## 2017-11-27 NOTE — ED NOTES
"Rad here to  pt for same. States pt \"has to leave for court in [place]\". erp aware of same. AMA paperwork completed, pt amb out to er, encouraged to return as needed  "

## 2017-11-27 NOTE — ED PROVIDER NOTES
ED Provider Note    CHIEF COMPLAINT  Chief Complaint   Patient presents with   • Shoulder Injury     scapula       HPI  Aris Douglass is a 32 y.o. male who presents to the ED after a fall off a ladder. The patient states he only fell a few feet off a ladder last night, the latter gave out underneath him, he landed onto his right side, it is sharp severe pain around his scapula area his right-sided ribs. He states he does not feel short of breath but he does have increasing pain with breathing. The patient denies any abdominal pains, nausea vomiting, did not hit his head, no numbness, tingling, weakness.    REVIEW OF SYSTEMS  See HPI for further details.   PAST MEDICAL HISTORY  Past Medical History:   Diagnosis Date   • Fracture 2000    right foot/ right ankle   • No known health problems        FAMILY HISTORY  No family history on file.    SOCIAL HISTORY  Social History     Social History   • Marital status: Single     Spouse name: N/A   • Number of children: N/A   • Years of education: N/A     Social History Main Topics   • Smoking status: Current Every Day Smoker     Packs/day: 0.25     Years: 10.00     Types: Cigarettes   • Smokeless tobacco: Never Used   • Alcohol use No   • Drug use: No   • Sexual activity: Not on file     Other Topics Concern   •  Service No   • Blood Transfusions No   • Caffeine Concern No   • Occupational Exposure No   • Hobby Hazards No   • Sleep Concern Yes   • Stress Concern No   • Weight Concern No   • Special Diet No   • Back Care Yes   • Exercise Yes   • Bike Helmet No   • Seat Belt Yes   • Self-Exams No     Social History Narrative   • No narrative on file       SURGICAL HISTORY  Past Surgical History:   Procedure Laterality Date   • ELBOW ORIF  2001    left elbow   • PERCUTANEOUOSPINNING WRIST  2001    left    • MANDIBLE REDUCTION CLOSED  2001   • KNEE ARTHROSCOPY  2000    right knee       CURRENT MEDICATIONS  Home Medications     Reviewed by Lashaun Ray, CameronT  "(Pharmacy Tech) on 11/27/17 at 1208  Med List Status: Complete   Medication Last Dose Status   amoxicillin-clavulanate (AUGMENTIN) 875-125 MG Tab 11/12/2017 Active   baclofen (LIORESAL) 10 MG Tab 11/26/2017 Active   gabapentin (NEURONTIN) 800 MG tablet 11/26/2017 Active                ALLERGIES  Allergies   Allergen Reactions   • Hydrocodone Rash and Itching     .         PHYSICAL EXAM  VITAL SIGNS: /95   Pulse 87   Temp 37.5 °C (99.5 °F)   Resp 16   Ht 1.93 m (6' 4\")   Wt 68.9 kg (151 lb 14.4 oz)   SpO2 98%   BMI 18.49 kg/m²   Constitutional:Well-developed well-nourished male in mild distress  HENT: Atraumatic, nose normal, oropharynx is clear, no midline C-spine tenderness palpation  Eyes: PERRLA, EOMI, Conjunctiva normal, No discharge.   Neck: No midline C-spine tenderness palpation  Cardiovascular: Normal heart rate, Normal rhythm   Thorax & Lungs: Normal breath sounds, No respiratory distress, No wheezing, No chest tenderness.   Abdomen: Bowel sounds normal, Soft, No tenderness, No masses, No pulsatile masses.   Skin: Warm, Dry, No erythema, No rash.   Back: Patient has paraspinal tenderness throughout the mid thoracic area on the right, tenderness around the rhomboids, scapula area with decreased range of motion he also has tenderness palpation on the race lateral posterior rib cage.  Extremities: Intact distal pulses, No edema, No tenderness.   Musculoskeletal: Bony shoulder anterior and lateral is normal, decreased range of motion secondary to scapular pain  Neurologic: Alert & oriented x 3, Normal motor function, Normal sensory function, No focal deficits noted.     RADIOLOGY/PROCEDURES  No orders to display         COURSE & MEDICAL DECISION MAKING  Pertinent Labs & Imaging studies reviewed. (See chart for details)  Patient with right scapular pain also some rib pain originally ordered a scapula x-ray and rib x-ray however the patient does not want to wait for x-rays needs to leave, discussed " the risk of him leaving the emergency department, the patient is awake and alert able to make appropriate decisions, signed AMA paperwork.    The patient is leaving against medical advice.  I discussed with the patient the risks of leaving without receiving appropriate care to include permanent disability or death.  I have discussed possible alternatives.  The patient is not intoxicated.  The patient is a capable decision maker and understands the risks and benefits of their decision.  While I certainly disagree with the patient's decision, I respect the patient's autonomy and will not keep them here against their will.  They understand that their decision to leave can be reversed at any time and they can return to us at any time for any reason at all.      FINAL IMPRESSION  1. Acute pain of right shoulder        Patient referred to primary care provider for blood pressure management     This dictation was created using voice recognition software. The accuracy of the dictation is limited to the abilities of the software. I expect there may be some errors of grammar and possibly content. The nursing notes were reviewed and certain aspects of this information were incorporated into this note.    Electronically signed by: Gonzalez Petersen, 11/27/2017

## 2017-11-27 NOTE — ED NOTES
"Medicated as ordered, aware waiting for Radiology.  Offered ice pack, pt shook head \"no\" and stated \"I'm alright.\"  "